# Patient Record
Sex: MALE | Race: WHITE | NOT HISPANIC OR LATINO | Employment: FULL TIME | URBAN - METROPOLITAN AREA
[De-identification: names, ages, dates, MRNs, and addresses within clinical notes are randomized per-mention and may not be internally consistent; named-entity substitution may affect disease eponyms.]

---

## 2017-01-30 ENCOUNTER — ALLSCRIPTS OFFICE VISIT (OUTPATIENT)
Dept: OTHER | Facility: OTHER | Age: 47
End: 2017-01-30

## 2017-01-30 DIAGNOSIS — I10 ESSENTIAL (PRIMARY) HYPERTENSION: ICD-10-CM

## 2017-01-30 DIAGNOSIS — E11.9 TYPE 2 DIABETES MELLITUS WITHOUT COMPLICATIONS (HCC): ICD-10-CM

## 2017-01-30 DIAGNOSIS — E78.1 PURE HYPERGLYCERIDEMIA: ICD-10-CM

## 2017-03-15 DIAGNOSIS — D72.829 ELEVATED WHITE BLOOD CELL COUNT: ICD-10-CM

## 2017-05-30 ENCOUNTER — GENERIC CONVERSION - ENCOUNTER (OUTPATIENT)
Dept: OTHER | Facility: OTHER | Age: 47
End: 2017-05-30

## 2017-05-30 ENCOUNTER — ALLSCRIPTS OFFICE VISIT (OUTPATIENT)
Dept: OTHER | Facility: OTHER | Age: 47
End: 2017-05-30

## 2017-05-30 DIAGNOSIS — E11.9 TYPE 2 DIABETES MELLITUS WITHOUT COMPLICATIONS (HCC): ICD-10-CM

## 2017-11-24 ENCOUNTER — APPOINTMENT (OUTPATIENT)
Dept: LAB | Facility: HOSPITAL | Age: 47
End: 2017-11-24
Attending: INTERNAL MEDICINE

## 2017-11-24 DIAGNOSIS — E11.9 TYPE 2 DIABETES MELLITUS WITHOUT COMPLICATIONS (HCC): ICD-10-CM

## 2017-11-24 LAB
CHOLEST SERPL-MCNC: 184 MG/DL (ref 50–200)
CREAT UR-MCNC: 150 MG/DL
EST. AVERAGE GLUCOSE BLD GHB EST-MCNC: 177 MG/DL
GLUCOSE P FAST SERPL-MCNC: 209 MG/DL (ref 65–99)
HBA1C MFR BLD: 7.8 % (ref 4.2–6.3)
HDLC SERPL-MCNC: 36 MG/DL (ref 40–60)
LDLC SERPL CALC-MCNC: 92 MG/DL (ref 0–100)
MICROALBUMIN UR-MCNC: 21.4 MG/L (ref 0–20)
MICROALBUMIN/CREAT 24H UR: 14 MG/G CREATININE (ref 0–30)
TRIGL SERPL-MCNC: 281 MG/DL

## 2017-11-24 PROCEDURE — 82570 ASSAY OF URINE CREATININE: CPT

## 2017-11-24 PROCEDURE — 82043 UR ALBUMIN QUANTITATIVE: CPT

## 2017-11-24 PROCEDURE — 82947 ASSAY GLUCOSE BLOOD QUANT: CPT

## 2017-11-24 PROCEDURE — 83036 HEMOGLOBIN GLYCOSYLATED A1C: CPT

## 2017-11-24 PROCEDURE — 36415 COLL VENOUS BLD VENIPUNCTURE: CPT

## 2017-11-24 PROCEDURE — 80061 LIPID PANEL: CPT

## 2017-11-27 ENCOUNTER — GENERIC CONVERSION - ENCOUNTER (OUTPATIENT)
Dept: OTHER | Facility: OTHER | Age: 47
End: 2017-11-27

## 2017-11-27 DIAGNOSIS — K21.9 GASTRO-ESOPHAGEAL REFLUX DISEASE WITHOUT ESOPHAGITIS: ICD-10-CM

## 2017-11-27 DIAGNOSIS — I10 ESSENTIAL (PRIMARY) HYPERTENSION: ICD-10-CM

## 2017-11-27 DIAGNOSIS — E11.9 TYPE 2 DIABETES MELLITUS WITHOUT COMPLICATIONS (HCC): ICD-10-CM

## 2017-11-27 DIAGNOSIS — E78.1 PURE HYPERGLYCERIDEMIA: ICD-10-CM

## 2017-12-02 ENCOUNTER — GENERIC CONVERSION - ENCOUNTER (OUTPATIENT)
Dept: INTERNAL MEDICINE CLINIC | Age: 47
End: 2017-12-02

## 2017-12-02 LAB
LEFT EYE DIABETIC RETINOPATHY: NORMAL
RIGHT EYE DIABETIC RETINOPATHY: NORMAL

## 2017-12-27 ENCOUNTER — ALLSCRIPTS OFFICE VISIT (OUTPATIENT)
Dept: OTHER | Facility: OTHER | Age: 47
End: 2017-12-27

## 2018-01-13 VITALS
SYSTOLIC BLOOD PRESSURE: 148 MMHG | DIASTOLIC BLOOD PRESSURE: 92 MMHG | BODY MASS INDEX: 34.7 KG/M2 | HEIGHT: 70 IN | HEART RATE: 94 BPM | OXYGEN SATURATION: 97 % | WEIGHT: 242.38 LBS | TEMPERATURE: 98.2 F

## 2018-01-22 VITALS
SYSTOLIC BLOOD PRESSURE: 136 MMHG | OXYGEN SATURATION: 99 % | WEIGHT: 238.38 LBS | HEIGHT: 70 IN | BODY MASS INDEX: 34.13 KG/M2 | DIASTOLIC BLOOD PRESSURE: 82 MMHG | HEART RATE: 98 BPM | TEMPERATURE: 98.2 F

## 2018-01-22 VITALS
HEART RATE: 99 BPM | BODY MASS INDEX: 33.64 KG/M2 | TEMPERATURE: 98.1 F | SYSTOLIC BLOOD PRESSURE: 136 MMHG | HEIGHT: 70 IN | DIASTOLIC BLOOD PRESSURE: 82 MMHG | OXYGEN SATURATION: 98 % | WEIGHT: 235 LBS

## 2018-01-23 NOTE — MISCELLANEOUS
Message  Return to work or school:   Nii Carrillo is under my professional care  He was seen in my office on 12/27/2017   He is able to return to work on  12/28/2017      Please excuse Mr Rosalia Jerez from work today, 12/27/2017  Thank you  Past Medical History  Active Problems And Past Medical History Reviewed: The active problems and past medical history were reviewed and updated today  Family History  Family History Reviewed: The family history was reviewed and updated today  Surgical History  Surgical History Reviewed: The surgical history was reviewed and updated today  Future Appointments    Signatures   Electronically signed by :  Precious Quan MD; Dec 27 2017  4:05PM EST                       (Author)

## 2018-01-24 VITALS
WEIGHT: 240.5 LBS | HEIGHT: 70 IN | DIASTOLIC BLOOD PRESSURE: 90 MMHG | BODY MASS INDEX: 34.43 KG/M2 | TEMPERATURE: 98.1 F | HEART RATE: 87 BPM | SYSTOLIC BLOOD PRESSURE: 134 MMHG | OXYGEN SATURATION: 98 %

## 2018-03-26 DIAGNOSIS — E11.9 TYPE 2 DIABETES MELLITUS WITHOUT COMPLICATIONS (HCC): ICD-10-CM

## 2018-03-28 RX ORDER — ATORVASTATIN CALCIUM 20 MG/1
TABLET, FILM COATED ORAL
Qty: 30 TABLET | Refills: 3 | OUTPATIENT
Start: 2018-03-28

## 2018-06-18 ENCOUNTER — TELEPHONE (OUTPATIENT)
Dept: INTERNAL MEDICINE CLINIC | Facility: CLINIC | Age: 48
End: 2018-06-18

## 2018-06-18 DIAGNOSIS — E11.9 DIABETES MELLITUS TYPE 2, NONINSULIN DEPENDENT (HCC): Primary | ICD-10-CM

## 2018-06-18 DIAGNOSIS — K21.9 GASTROESOPHAGEAL REFLUX DISEASE WITHOUT ESOPHAGITIS: ICD-10-CM

## 2018-06-18 RX ORDER — GLIMEPIRIDE 4 MG/1
1 TABLET ORAL 2 TIMES DAILY
COMMUNITY
Start: 2014-02-08 | End: 2018-06-18 | Stop reason: SDUPTHER

## 2018-06-18 RX ORDER — LANSOPRAZOLE 30 MG/1
1 CAPSULE, DELAYED RELEASE ORAL DAILY
COMMUNITY
Start: 2014-11-08 | End: 2018-06-18 | Stop reason: SDUPTHER

## 2018-06-18 NOTE — TELEPHONE ENCOUNTER
This patient had to have his appt rescheduled due to Dr Debra Harrington not being in the office 2 times  He does have an appt with Mario on 7/16  He does need his Metformin, Glimopride, and Omeprazol refilled  Can we please fill these to get him to his appt  His Pharmacy is [facility] in [place]

## 2018-06-19 RX ORDER — GLIMEPIRIDE 4 MG/1
4 TABLET ORAL 2 TIMES DAILY
Qty: 180 TABLET | Refills: 0 | Status: SHIPPED | OUTPATIENT
Start: 2018-06-19 | End: 2018-10-17 | Stop reason: SDUPTHER

## 2018-06-19 RX ORDER — LANSOPRAZOLE 30 MG/1
30 CAPSULE, DELAYED RELEASE ORAL DAILY
Qty: 90 CAPSULE | Refills: 0 | Status: SHIPPED | OUTPATIENT
Start: 2018-06-19 | End: 2018-10-17 | Stop reason: SDUPTHER

## 2018-06-23 LAB — HBA1C MFR BLD HPLC: 7.9 %

## 2018-07-16 ENCOUNTER — OFFICE VISIT (OUTPATIENT)
Dept: INTERNAL MEDICINE CLINIC | Facility: CLINIC | Age: 48
End: 2018-07-16
Payer: COMMERCIAL

## 2018-07-16 VITALS
DIASTOLIC BLOOD PRESSURE: 78 MMHG | TEMPERATURE: 98.5 F | BODY MASS INDEX: 34.21 KG/M2 | HEIGHT: 69 IN | HEART RATE: 100 BPM | WEIGHT: 231 LBS | RESPIRATION RATE: 20 BRPM | OXYGEN SATURATION: 97 % | SYSTOLIC BLOOD PRESSURE: 116 MMHG

## 2018-07-16 DIAGNOSIS — E11.8 TYPE 2 DIABETES MELLITUS WITH COMPLICATION, WITHOUT LONG-TERM CURRENT USE OF INSULIN (HCC): ICD-10-CM

## 2018-07-16 DIAGNOSIS — B35.1 ONYCHOMYCOSIS: ICD-10-CM

## 2018-07-16 DIAGNOSIS — I10 ESSENTIAL HYPERTENSION: Primary | ICD-10-CM

## 2018-07-16 DIAGNOSIS — B35.3 TINEA PEDIS OF BOTH FEET: ICD-10-CM

## 2018-07-16 PROBLEM — E78.1 HYPERTRIGLYCERIDEMIA: Status: ACTIVE | Noted: 2017-01-30

## 2018-07-16 PROCEDURE — 99214 OFFICE O/P EST MOD 30 MIN: CPT | Performed by: NURSE PRACTITIONER

## 2018-07-16 PROCEDURE — 3074F SYST BP LT 130 MM HG: CPT | Performed by: NURSE PRACTITIONER

## 2018-07-16 PROCEDURE — 3078F DIAST BP <80 MM HG: CPT | Performed by: NURSE PRACTITIONER

## 2018-07-16 RX ORDER — ELECTROLYTES/DEXTROSE
1 SOLUTION, ORAL ORAL DAILY
COMMUNITY

## 2018-07-16 RX ORDER — LOSARTAN POTASSIUM 100 MG/1
1 TABLET ORAL DAILY
COMMUNITY
Start: 2014-07-08 | End: 2018-07-16 | Stop reason: SDUPTHER

## 2018-07-16 RX ORDER — KETOCONAZOLE 20 MG/G
1 CREAM TOPICAL DAILY
Qty: 15 G | Refills: 3 | Status: SHIPPED | OUTPATIENT
Start: 2018-07-16 | End: 2020-01-07

## 2018-07-16 RX ORDER — FENOFIBRATE 145 MG/1
1 TABLET, COATED ORAL DAILY
COMMUNITY
Start: 2017-01-30 | End: 2018-10-17 | Stop reason: SDUPTHER

## 2018-07-16 RX ORDER — LOSARTAN POTASSIUM 100 MG/1
100 TABLET ORAL DAILY
Qty: 90 TABLET | Refills: 1 | Status: SHIPPED | OUTPATIENT
Start: 2018-07-16 | End: 2018-10-17 | Stop reason: SDUPTHER

## 2018-07-16 RX ORDER — KETOCONAZOLE 20 MG/G
1 CREAM TOPICAL DAILY
COMMUNITY
Start: 2016-07-19 | End: 2018-07-16 | Stop reason: SDUPTHER

## 2018-07-16 NOTE — PROGRESS NOTES
Assessment/Plan:    No problem-specific Assessment & Plan notes found for this encounter  Diagnoses and all orders for this visit:    Essential hypertension  -     losartan (COZAAR) 100 MG tablet; Take 1 tablet (100 mg total) by mouth daily    Type 2 diabetes mellitus with complication, without long-term current use of insulin ([facility])    Tinea pedis of both feet  -     ketoconazole (NIZORAL) 2 % cream; Apply 1 application topically daily  -     Ambulatory referral to Podiatry; Future    Onychomycosis  -     Ambulatory referral to Podiatry; Future    Other orders  -     fenofibrate (TRICOR) 145 mg tablet; Take 1 tablet by mouth daily  -     Discontinue: ketoconazole (NIZORAL) 2 % cream; Apply 1 application topically daily  -     Discontinue: losartan (COZAAR) 100 MG tablet; Take 1 tablet by mouth daily  -     Multiple Vitamins-Minerals (MULTIVITAMIN ADULT) TABS; Take 1 tablet by mouth daily  -     Omega 3-6-9 Fatty Acids (OMEGA 3-6-9 COMPLEX PO); Take 1 capsule by mouth daily      Discussed with patient have 0 hemoglobin A1c has worsened from 7 8-7 9, indicating that his point of care blood sugars are not indicative his A1c  After further investigation, it was determined that the patient eats small snacks intermittently throughout the day and a large meal at night  This is why he only take glimepiride once in the morning and metformin twice daily  The importance of a well-balanced meal 3 times a day which includes carbohydrates, protein, and vegetables was stressed to the patient and education was given to him regarding how blood sugars react to different types of foods  The patient verbalized understanding and indicates that he will start to eat a well-balanced meal 3 times daily, as well as start taking his glimepiride twice daily as prescribed, as well as continue with his metformin twice daily  Will continue with his current dosing of losartan    With regards to his onychomycosis, will refer to Podiatry for further management, given that the patient is a diabetic  Patient verbalizes understanding  Will follow up with patient in 6 weeks or sooner as needed  Subjective:      Patient ID: Caitlin Boateng is a 51 y o  male  Patient presents for a 4 month follow up on HTN and diabetes mellitus  He reports that he feels well overall  He has only been taking glimeperide once daily because he has been checking his blood sugars and they are in the 110's and he thought that was okay for him  His HgbA1c was 7 8 in November 2017, and is now 7 9  Diabetes   He presents for his follow-up diabetic visit  He has type 2 diabetes mellitus  His disease course has been worsening  Hypoglycemia symptoms include nervousness/anxiousness (at baseline per patient)  Pertinent negatives for hypoglycemia include no confusion or headaches  Associated symptoms include fatigue and polydipsia  Pertinent negatives for diabetes include no blurred vision, no chest pain, no foot paresthesias, no foot ulcerations, no polyphagia, no polyuria, no visual change, no weakness and no weight loss  Symptoms are stable  Risk factors for coronary artery disease include diabetes mellitus, dyslipidemia, hypertension, male sex and obesity  Current diabetic treatment includes oral agent (dual therapy)  He is compliant with treatment some of the time  He is following a generally unhealthy diet  When asked about meal planning, he reported none  His home blood glucose trend is fluctuating dramatically  An ACE inhibitor/angiotensin II receptor blocker is being taken  He does not see a podiatrist Eye exam is current  Hypertension   This is a chronic problem  The current episode started more than 1 year ago  The problem is unchanged  The problem is controlled  Associated symptoms include malaise/fatigue  Pertinent negatives include no blurred vision, chest pain, headaches, peripheral edema or shortness of breath   There are no associated agents to hypertension  Risk factors for coronary artery disease include diabetes mellitus, dyslipidemia, male gender and smoking/tobacco exposure  Past treatments include angiotensin blockers  The current treatment provides moderate improvement  The following portions of the patient's history were reviewed and updated as appropriate: allergies, current medications, past family history, past medical history, past social history, past surgical history and problem list     Review of Systems   Constitutional: Positive for fatigue and malaise/fatigue  Negative for chills, fever and weight loss  HENT: Negative for congestion and trouble swallowing  Eyes: Negative for blurred vision and pain  Respiratory: Negative for choking, shortness of breath and wheezing  Cardiovascular: Negative for chest pain and leg swelling  Gastrointestinal: Positive for diarrhea (occasionally)  Negative for abdominal pain, constipation, nausea and vomiting  Endocrine: Positive for polydipsia  Negative for polyphagia and polyuria  Genitourinary: Negative for dysuria  Musculoskeletal: Negative for gait problem  Skin: Negative for rash  Neurological: Negative for weakness and headaches  Psychiatric/Behavioral: Negative for confusion  The patient is nervous/anxious (at baseline per patient)            Past Medical History:   Diagnosis Date    Cellulitis of elbow     last assessed - [id number]    Diverticulitis     Fatigue     last assessed - [id number]    History of gastroesophageal reflux (GERD)     Resolved - [id number]    Iron overload     last assessed - [id number]    Low HDL (under 40)     last assessed - [id number]    Tinea pedis of both feet     last assessed - [id number]         Current Outpatient Prescriptions:     fenofibrate (TRICOR) 145 mg tablet, Take 1 tablet by mouth daily, Disp: , Rfl:     glimepiride (AMARYL) 4 mg tablet, Take 1 tablet (4 mg total) by mouth 2 (two) times a day (Patient taking differently: Take 4 mg by mouth daily with breakfast  ), Disp: 180 tablet, Rfl: 0    ketoconazole (NIZORAL) 2 % cream, Apply 1 application topically daily, Disp: 15 g, Rfl: 3    lansoprazole (PREVACID) 30 mg capsule, Take 1 capsule (30 mg total) by mouth daily, Disp: 90 capsule, Rfl: 0    losartan (COZAAR) 100 MG tablet, Take 1 tablet (100 mg total) by mouth daily, Disp: 90 tablet, Rfl: 1    metFORMIN (GLUCOPHAGE) 1000 MG tablet, Take 1 tablet (1,000 mg total) by mouth 2 (two) times a day with meals, Disp: 180 tablet, Rfl: 0    Multiple Vitamins-Minerals (MULTIVITAMIN ADULT) TABS, Take 1 tablet by mouth daily, Disp: , Rfl:     Omega 3-6-9 Fatty Acids (OMEGA 3-6-9 COMPLEX PO), Take 1 capsule by mouth daily, Disp: , Rfl:     No Known Allergies    Social History   Past Surgical History:   Procedure Laterality Date    TONSILLECTOMY       Family History   Problem Relation Age of Onset    Leukemia Father     Diabetes Family     Hypertension Family        Objective:  /78 (BP Location: Left arm, Patient Position: Sitting, Cuff Size: Large)   Pulse 100   Temp 98 5 °F (36 9 °C) (Oral)   Resp 20   Ht 5' 9" (1 753 m) Comment: with shoes on  Wt 105 kg (231 lb) Comment: with shoes on  SpO2 97% Comment: room air  BMI 34 11 kg/m²        Physical Exam   Constitutional: He is oriented to person, place, and time  He appears well-developed and well-nourished  No distress  HENT:   Head: Normocephalic and atraumatic  Right Ear: External ear normal    Left Ear: External ear normal    Mouth/Throat: Oropharynx is clear and moist    Eyes: Conjunctivae are normal  Pupils are equal, round, and reactive to light  No scleral icterus  Neck: Normal range of motion  Neck supple  No thyromegaly present  Cardiovascular: Normal rate, regular rhythm and normal heart sounds  Pulmonary/Chest: Effort normal and breath sounds normal  No respiratory distress  Abdominal: Soft  Bowel sounds are normal  He exhibits no distension  Musculoskeletal: Normal range of motion  He exhibits no edema  Lymphadenopathy:     He has no cervical adenopathy  Neurological: He is alert and oriented to person, place, and time  Skin: Skin is warm and dry  Psychiatric: He has a normal mood and affect  His behavior is normal  Judgment and thought content normal    Vitals reviewed

## 2018-07-16 NOTE — PROGRESS NOTES
Diabetic Foot Exam    Patient's shoes and socks removed  Right Foot/Ankle   Right Foot Inspection  Skin Exam: dry skin                          Toe Exam: ROM and strength within normal limits  Sensory       Monofilament testing: intact  Vascular  Capillary refills: < 3 seconds  The right DP pulse is 2+  The right PT pulse is 2+  Left Foot/Ankle  Left Foot Inspection  Skin Exam: dry skin                         Toe Exam: ROM and strength within normal limits                   Sensory       Monofilament: intact  Vascular  Capillary refills: < 3 seconds  The left DP pulse is 2+  The left PT pulse is 2+  Assign Risk Category:  No deformity present;  No loss of protective sensation;        Risk: 0

## 2018-07-16 NOTE — PATIENT INSTRUCTIONS
Start taking your glimeperide twice daily as prescribed  Continue with metformin as prescribed  Referred to podiatry for evaluation of toenails  Meal Planning with the Plate Method   AMBULATORY CARE:   Meal planning with the plate method  is a simple way for people with diabetes to plan meals  This method can help you eat the right amount of carbohydrates and keep your blood sugar levels under control  Carbohydrates naturally raise your blood sugar level  Your blood sugar level can rise too high if you eat too much carbohydrate at one time  Carbohydrates are found in starches (bread, cereal, starchy vegetables, and beans), fruit, milk, yogurt, and sweets  How to use the plate method to plan meals:   · Draw an imaginary line down the middle of a 9-inch dinner plate  On one side, draw another line to divide that section in half  Your plate will have 3 sections  · Fill the largest section of your plate with non-starchy vegetables  These include broccoli, spinach, cucumbers, peppers, cauliflower, and tomatoes  · Add a starch to 1 of the small sections of your plate  Starches include pasta, rice, whole-grain bread, tortillas, corn, potatoes, and beans  · Add meat or another source of protein to the other small section of your plate  Examples include chicken or turkey without skin, fish, lean beef or pork, low-fat cheese, tofu, or eggs  · Add dairy or fruit to the side of your plate if your meal plan allows  Examples of dairy include skim or 1% milk or low-fat yogurt  If you do not drink milk, you may be able to add another serving of starchy food instead  · Add a low-calorie or calorie-free drink such as water or unsweetened tea or coffee         Serving sizes of foods:   · Non-starchy vegetables:      ¨ ½ cup of cooked vegetables or 1 cup of raw vegetables    ¨ ½ cup of vegetable juice    · Starches:      ¨ 1 ounce of whole-wheat bread or 1 small (6 inch) flour or corn tortilla    ¨ 1 small (4 inch) pancake (about ¼ inch thick)    ¨ ¾ cup of dry, unsweetened, whole-grain ready-to-eat cereal or ¼ cup of low-fat granola    ¨ ½ cup of cooked cereal or oatmeal    ¨ ? cup of rice or pasta     ¨ ½ cup of corn, green peas, sweet potatoes, or mashed potatoes    ¨ ½ cup of cooked beans and peas (garbanzo, lucas, kidney, white, split, black-eyed)    · Meat and other protein sources:      ¨ 3 to 4 ounces of any lean meat, fish, or poultry    ¨ ½ cup of tofu or tempeh    ¨ 1 large egg    ¨ 1½ ounces (about 2 tablespoons) of nuts or 2 tablespoons of peanut butter    · Fruit:      ¨ 1 small piece of fresh fruit     ¨ ½ cup of canned or fresh fruit or unsweetened fruit juice    ¨ ¼ cup of dried fruit    · Milk and yogurt:      ¨ 1 cup (8 ounces) of skim or 1% milk    ¨ ¾ cup (6 ounces) of plain, non-fat yogurt  Other guidelines to follow:   · Limit salt and sugar  Choose and prepare foods and drinks with less salt and added sugars  Use the nutrition information on food labels to help you make healthy choices  The percent daily value listed on the food label tells you whether a food is low or high in certain nutrients  A percent daily value of 5% or less means that the food is low in a nutrient  A percent daily value of 20% or more means that the food is high in a nutrient  · Choose healthy fats  Choose healthy fats such as polyunsaturated and monounsaturated fats in place of unhealthy fats  Healthy fats are found in vegetable oils such as soybean, corn, canola, olive, and sunflower oil  Unhealthy fats are saturated fats, trans fats, and cholesterol  Unhealthy fats are found in shortening, butter, stick margarine, and animal fat  · Ask your healthcare provider if alcohol is safe for you  and how much is safe for you  If you choose to drink alcohol, drink it with meals  When you drink alcohol on an empty stomach, your blood sugar may fall to a low level    © 2017 [employer] [employer] Information is for End User's use only and may not be sold, redistributed or otherwise used for commercial purposes  All illustrations and images included in CareNotes® are the copyrighted property of [employer]  or [employer]  The above information is an  only  It is not intended as medical advice for individual conditions or treatments  Talk to your doctor, nurse or pharmacist before following any medical regimen to see if it is safe and effective for you

## 2018-09-14 DIAGNOSIS — K21.9 GASTROESOPHAGEAL REFLUX DISEASE WITHOUT ESOPHAGITIS: ICD-10-CM

## 2018-09-14 DIAGNOSIS — E11.9 DIABETES MELLITUS TYPE 2, NONINSULIN DEPENDENT (HCC): ICD-10-CM

## 2018-09-16 RX ORDER — LANSOPRAZOLE 30 MG/1
CAPSULE, DELAYED RELEASE ORAL
Qty: 90 CAPSULE | Refills: 0 | OUTPATIENT
Start: 2018-09-16

## 2018-09-22 LAB — HBA1C MFR BLD HPLC: 8.3 %

## 2018-09-28 DIAGNOSIS — E11.9 DIABETES MELLITUS TYPE 2, NONINSULIN DEPENDENT (HCC): ICD-10-CM

## 2018-09-30 RX ORDER — GLIMEPIRIDE 4 MG/1
4 TABLET ORAL 2 TIMES DAILY
Qty: 180 TABLET | Refills: 0 | OUTPATIENT
Start: 2018-09-30

## 2018-10-17 ENCOUNTER — OFFICE VISIT (OUTPATIENT)
Dept: INTERNAL MEDICINE CLINIC | Facility: CLINIC | Age: 48
End: 2018-10-17
Payer: COMMERCIAL

## 2018-10-17 VITALS
TEMPERATURE: 98.4 F | DIASTOLIC BLOOD PRESSURE: 100 MMHG | OXYGEN SATURATION: 98 % | HEIGHT: 69 IN | BODY MASS INDEX: 34.16 KG/M2 | SYSTOLIC BLOOD PRESSURE: 180 MMHG | WEIGHT: 230.6 LBS | HEART RATE: 78 BPM

## 2018-10-17 DIAGNOSIS — E78.1 HYPERTRIGLYCERIDEMIA: Primary | ICD-10-CM

## 2018-10-17 DIAGNOSIS — I10 BENIGN ESSENTIAL HTN: ICD-10-CM

## 2018-10-17 DIAGNOSIS — E11.9 DIABETES MELLITUS TYPE 2, NONINSULIN DEPENDENT (HCC): ICD-10-CM

## 2018-10-17 DIAGNOSIS — E11.8 TYPE 2 DIABETES MELLITUS WITH COMPLICATION, WITHOUT LONG-TERM CURRENT USE OF INSULIN (HCC): ICD-10-CM

## 2018-10-17 DIAGNOSIS — K21.9 GASTROESOPHAGEAL REFLUX DISEASE WITHOUT ESOPHAGITIS: ICD-10-CM

## 2018-10-17 DIAGNOSIS — I10 ESSENTIAL HYPERTENSION: ICD-10-CM

## 2018-10-17 PROCEDURE — 4010F ACE/ARB THERAPY RXD/TAKEN: CPT | Performed by: NURSE PRACTITIONER

## 2018-10-17 PROCEDURE — 3008F BODY MASS INDEX DOCD: CPT | Performed by: NURSE PRACTITIONER

## 2018-10-17 PROCEDURE — 99214 OFFICE O/P EST MOD 30 MIN: CPT | Performed by: NURSE PRACTITIONER

## 2018-10-17 RX ORDER — FENOFIBRATE 145 MG/1
145 TABLET, COATED ORAL DAILY
Qty: 90 TABLET | Refills: 0 | Status: SHIPPED | OUTPATIENT
Start: 2018-10-17 | End: 2019-08-22 | Stop reason: ALTCHOICE

## 2018-10-17 RX ORDER — LOSARTAN POTASSIUM 100 MG/1
100 TABLET ORAL DAILY
Qty: 90 TABLET | Refills: 0 | Status: SHIPPED | OUTPATIENT
Start: 2018-10-17 | End: 2021-11-10 | Stop reason: SDUPTHER

## 2018-10-17 RX ORDER — ATORVASTATIN CALCIUM 20 MG/1
20 TABLET, FILM COATED ORAL DAILY
Qty: 90 TABLET | Refills: 0 | Status: SHIPPED | OUTPATIENT
Start: 2018-10-17 | End: 2019-08-22 | Stop reason: ALTCHOICE

## 2018-10-17 RX ORDER — GLIMEPIRIDE 4 MG/1
4 TABLET ORAL 2 TIMES DAILY
Qty: 180 TABLET | Refills: 0 | Status: SHIPPED | OUTPATIENT
Start: 2018-10-17 | End: 2020-01-07 | Stop reason: SDUPTHER

## 2018-10-17 RX ORDER — LANSOPRAZOLE 30 MG/1
30 CAPSULE, DELAYED RELEASE ORAL DAILY
Qty: 90 CAPSULE | Refills: 0 | Status: SHIPPED | OUTPATIENT
Start: 2018-10-17 | End: 2019-03-27 | Stop reason: SDUPTHER

## 2018-10-17 NOTE — ASSESSMENT & PLAN NOTE
Patient is cared currently well controlled on Prevacid  You may use OTC tums as needed  Recommend small frequent meals throughout the day  Avoid aggravating foods - spicy foods, acidic foods - such as tomato and citrus  Avoid alcohol  Do not lay down for at least 30 mins after eating

## 2018-10-17 NOTE — PROGRESS NOTES
Assessment/Plan:    GERD (gastroesophageal reflux disease)   Patient is cared currently well controlled on Prevacid  You may use OTC tums as needed  Recommend small frequent meals throughout the day  Avoid aggravating foods - spicy foods, acidic foods - such as tomato and citrus  Avoid alcohol  Do not lay down for at least 30 mins after eating  Type 2 diabetes mellitus ([facility] [address] )  Lab Results   Component Value Date    HGBA1C 8 3 09/22/2018     Patient's diabetes uncontrolled  Patient currently eats a high sugar and healthy diet  Patient does admit he  Recently quit drinking alcohol  Patient states that he takes his metformin twice a day however he was only taking his Amaryl once a day, and states he sometimes forgets his metformin  I discussed the risk of uncontrolled diabetes with this patient as he is not compliant with his medications  I advised patient to take his metformin and Amaryl as prescribed, and will get a follow-up hemoglobin A1c in 3 months  I also discussed with patient that his blood sugar readings which are sporadic are only indicated of his blood sugar at that time not an average of his blood sugars  Patient is to continue to work on diet and exercise  Limit sugars and carbohydrate intake  Avoid soda, juice, sweets, cookies, desserts, pasta, bread    Eat more whole grains, exercised 30 min of cardio at least 3 times a week  Also recommended daily foot exams to check for sores, and recommended yearly eye exams  Benign essential HTN    Patient's blood pressure elevated at a physical today, patient states he was rushing around, and drank a very large iced coffee prior to his visit today  I discussed signs and symptoms of stroke with patient patient does admit he has not been taking his blood pressure medications as prescribed  Discussed the risks of having an elevated blood pressure    Advised patient to continue to take losartan 100 mg tablet daily, and will get a recheck blood pressure in 1 week  Continue to monitor blood pressure at home  Goal BP is < 130/80  Contact our office for consistent elevations  Recommend low sodium diet  Exercise 30 minutes three times a week as tolerated  Recommend yearly eye exam       Hypertriglyceridemia    Patient did not get a lipid panel done prior to his appointment today  Patient does admit that he has been taking his fenofibrate atorvastatin and fish oil sporadically  Did advise patient to take his medications as prescribed and having an elevated cholesterol panel could put him at risk for stroke  Recommend healthy lifestyle choices for your cholesterol  Low fat/low cholesterol diet  Limit/avoid red meat  Eat more lean meat - chicken breast, ground turkey, fish  Exercise 30 mins at least 3 times a week as tolerated  Diagnoses and all orders for this visit:    Hypertriglyceridemia  -     fenofibrate (TRICOR) 145 mg tablet; Take 1 tablet (145 mg total) by mouth daily  -     atorvastatin (LIPITOR) 20 mg tablet; Take 1 tablet (20 mg total) by mouth daily  -     Lipid panel    Diabetes mellitus type 2, noninsulin dependent (HCC)  -     glimepiride (AMARYL) 4 mg tablet; Take 1 tablet (4 mg total) by mouth 2 (two) times a day  -     metFORMIN (GLUCOPHAGE) 1000 MG tablet; Take 1 tablet (1,000 mg total) by mouth 2 (two) times a day with meals    Gastroesophageal reflux disease without esophagitis  -     lansoprazole (PREVACID) 30 mg capsule; Take 1 capsule (30 mg total) by mouth daily    Essential hypertension  -     losartan (COZAAR) 100 MG tablet; Take 1 tablet (100 mg total) by mouth daily  -     Comprehensive metabolic panel; Future  -     CBC and differential    Benign essential HTN    Type 2 diabetes mellitus with complication, without long-term current use of insulin (HCC)  -     Hemoglobin A1C          Subjective:      Patient ID: Rajan Obregon is a 51 y o  male  Patient presents today to follow-up on DM and HTN  Patient has been very noncompliant with his medications, he has been taking his the metformin twice a day, and his Amaryl once a day  Patient states he has a very unhealthy high-calorie diet  Patient states he recently quit drinking alcohol  Patient currently states that we are unable to check his feet today, he refuses for diabetic foot exam   Patient also states that he has been taking his blood pressure medication on and off  Patient also states he has been taking his cholesterol medication on and off  Screenings:    Eye Doctor-wear glasses, he is due  Dentist- every 6 months          Diabetes   He presents for his follow-up diabetic visit  He has type 2 diabetes mellitus  His disease course has been worsening  Pertinent negatives for hypoglycemia include no dizziness or headaches  Pertinent negatives for diabetes include no blurred vision, no chest pain, no polydipsia, no polyphagia, no polyuria and no weakness  There are no hypoglycemic complications  Risk factors for coronary artery disease include diabetes mellitus, dyslipidemia, obesity, male sex and tobacco exposure  Current diabetic treatments:   Patient currently taking metformin in Amaryl however he is not compliant  He is compliant with treatment some of the time  Prior visit with dietitian:  patient refuses dietitian visit  He rarely participates in exercise  His bedtime blood glucose is taken between 10-11 pm  His bedtime blood glucose range is generally 110-130 mg/dl  Hypertension   This is a chronic problem  The current episode started more than 1 year ago  The problem has been waxing and waning since onset  The problem is uncontrolled  Pertinent negatives include no anxiety, blurred vision, chest pain, headaches, neck pain, palpitations, peripheral edema, PND or shortness of breath  Risk factors for coronary artery disease include male gender, obesity, sedentary lifestyle, smoking/tobacco exposure and dyslipidemia   Treatments tried: patient currently taking losartan  Compliance problems include exercise and diet ( patient takes his medication on and off, not compliant)  Hyperlipidemia   This is a chronic problem  The current episode started more than 1 year ago  Condition status:  patient did not have lipid panel done prior to office visit today  Exacerbating diseases include diabetes and obesity  Pertinent negatives include no chest pain or shortness of breath  Treatments tried:  patient currently taking atorvastatin, fenofibrate as well as fish oil however patient has not been taking these consistently  Compliance problems include adherence to diet  The following portions of the patient's history were reviewed and updated as appropriate: allergies, current medications, past family history, past medical history, past social history, past surgical history and problem list     Review of Systems   Constitutional: Negative for activity change, appetite change, chills, diaphoresis and fever  HENT: Negative for congestion, ear discharge, ear pain, postnasal drip, rhinorrhea, sinus pain, sinus pressure and sore throat  Eyes: Negative for blurred vision, pain, discharge, itching and visual disturbance  Respiratory: Negative for cough, chest tightness, shortness of breath and wheezing  Cardiovascular: Negative for chest pain, palpitations, leg swelling and PND  Gastrointestinal: Negative for abdominal pain, blood in stool, constipation, diarrhea, nausea and vomiting  Endocrine: Negative for polydipsia, polyphagia and polyuria  Genitourinary: Negative for difficulty urinating, dysuria, hematuria and urgency  Musculoskeletal: Negative for arthralgias, back pain and neck pain  Skin: Negative for rash and wound  Neurological: Negative for dizziness, weakness, numbness and headaches           Past Medical History:   Diagnosis Date    Cellulitis of elbow     last assessed - [id number]    Diabetes mellitus ([facility] [address] )     Diverticulitis     Fatigue     last assessed - [id number]    GERD (gastroesophageal reflux disease)     History of gastroesophageal reflux (GERD)     Resolved - [id number]    Hypertension     Iron overload     last assessed - [id number]    Low HDL (under 40)     last assessed - [id number]    Tinea pedis of both feet     last assessed - [id number]         Current Outpatient Prescriptions:     fenofibrate (TRICOR) 145 mg tablet, Take 1 tablet (145 mg total) by mouth daily, Disp: 90 tablet, Rfl: 0    glimepiride (AMARYL) 4 mg tablet, Take 1 tablet (4 mg total) by mouth 2 (two) times a day, Disp: 180 tablet, Rfl: 0    ketoconazole (NIZORAL) 2 % cream, Apply 1 application topically daily, Disp: 15 g, Rfl: 3    lansoprazole (PREVACID) 30 mg capsule, Take 1 capsule (30 mg total) by mouth daily, Disp: 90 capsule, Rfl: 0    losartan (COZAAR) 100 MG tablet, Take 1 tablet (100 mg total) by mouth daily, Disp: 90 tablet, Rfl: 0    metFORMIN (GLUCOPHAGE) 1000 MG tablet, Take 1 tablet (1,000 mg total) by mouth 2 (two) times a day with meals, Disp: 180 tablet, Rfl: 0    Multiple Vitamins-Minerals (MULTIVITAMIN ADULT) TABS, Take 1 tablet by mouth daily, Disp: , Rfl:     Omega 3-6-9 Fatty Acids (OMEGA 3-6-9 COMPLEX PO), Take 1 capsule by mouth daily, Disp: , Rfl:     atorvastatin (LIPITOR) 20 mg tablet, Take 1 tablet (20 mg total) by mouth daily, Disp: 90 tablet, Rfl: 0    No Known Allergies    Social History   Past Surgical History:   Procedure Laterality Date    TONSILLECTOMY       Family History   Problem Relation Age of Onset    Leukemia Father     Diabetes Family     Hypertension Family        Objective:  BP (!) 180/100 (BP Location: Left arm, Patient Position: Sitting, Cuff Size: Large)   Pulse 78   Temp 98 4 °F (36 9 °C) (Oral)   Ht 5' 9" (1 753 m)   Wt 105 kg (230 lb 9 6 oz)   SpO2 98%   BMI 34 05 kg/m²     Recent Results (from the past 1344 hour(s))   Hemoglobin A1C    Collection Time: 09/22/18 12:00 AM Result Value Ref Range    Hemoglobin A1C 8 3             Physical Exam   Constitutional: He is oriented to person, place, and time  He appears well-developed and well-nourished  No distress  HENT:   Head: Normocephalic and atraumatic  Right Ear: External ear normal    Left Ear: External ear normal    Nose: Nose normal    Mouth/Throat: Oropharynx is clear and moist  No oropharyngeal exudate  Eyes: Pupils are equal, round, and reactive to light  Conjunctivae and EOM are normal  Right eye exhibits no discharge  Left eye exhibits no discharge  Neck: Normal range of motion  Neck supple  No thyromegaly present  Cardiovascular: Normal rate, regular rhythm, normal heart sounds and intact distal pulses  Exam reveals no gallop and no friction rub  No murmur heard  Pulmonary/Chest: Effort normal and breath sounds normal  No stridor  No respiratory distress  He has no wheezes  He has no rales  Abdominal: Soft  Bowel sounds are normal  He exhibits no distension  There is no tenderness  Lymphadenopathy:     He has no cervical adenopathy  Neurological: He is alert and oriented to person, place, and time  Skin: Skin is warm and dry  No rash noted  He is not diaphoretic  No erythema  Psychiatric: He has a normal mood and affect   His behavior is normal  Judgment and thought content normal

## 2018-10-17 NOTE — ASSESSMENT & PLAN NOTE
Lab Results   Component Value Date    HGBA1C 8 3 09/22/2018     Patient's diabetes uncontrolled  Patient currently eats a high sugar and healthy diet  Patient does admit he  Recently quit drinking alcohol  Patient states that he takes his metformin twice a day however he was only taking his Amaryl once a day, and states he sometimes forgets his metformin  I discussed the risk of uncontrolled diabetes with this patient as he is not compliant with his medications  I advised patient to take his metformin and Amaryl as prescribed, and will get a follow-up hemoglobin A1c in 3 months  I also discussed with patient that his blood sugar readings which are sporadic are only indicated of his blood sugar at that time not an average of his blood sugars  Patient is to continue to work on diet and exercise  Limit sugars and carbohydrate intake  Avoid soda, juice, sweets, cookies, desserts, pasta, bread    Eat more whole grains, exercised 30 min of cardio at least 3 times a week  Also recommended daily foot exams to check for sores, and recommended yearly eye exams

## 2018-10-17 NOTE — ASSESSMENT & PLAN NOTE
Patient's blood pressure elevated at a physical today, patient states he was rushing around, and drank a very large iced coffee prior to his visit today  I discussed signs and symptoms of stroke with patient patient does admit he has not been taking his blood pressure medications as prescribed  Discussed the risks of having an elevated blood pressure  Advised patient to continue to take losartan 100 mg tablet daily, and will get a recheck blood pressure in 1 week  Continue to monitor blood pressure at home  Goal BP is < 130/80  Contact our office for consistent elevations  Recommend low sodium diet  Exercise 30 minutes three times a week as tolerated    Recommend yearly eye exam

## 2018-10-27 ENCOUNTER — TELEPHONE (OUTPATIENT)
Dept: INTERNAL MEDICINE CLINIC | Facility: CLINIC | Age: 48
End: 2018-10-27

## 2018-10-27 DIAGNOSIS — I10 BENIGN ESSENTIAL HTN: Primary | ICD-10-CM

## 2018-10-27 RX ORDER — AMLODIPINE BESYLATE 10 MG/1
10 TABLET ORAL DAILY
Qty: 30 TABLET | Refills: 0 | Status: SHIPPED | OUTPATIENT
Start: 2018-10-27 | End: 2019-09-19 | Stop reason: SDUPTHER

## 2018-10-27 NOTE — TELEPHONE ENCOUNTER
10/27/18- Pt came in today for a BP check for 10:30 am today  When I took his blood pressure I explained to him if its high he needs to see the doctor  He kept telling me its just for a check not an actual visit  He stated that he had people out in the car waiting for him  His blood pressure when I took it was 168/74  Pt left against medical advice   Ariana Torres MA

## 2018-10-27 NOTE — TELEPHONE ENCOUNTER
Called to discuss with patient, will add on Norvasc 10 mg  His wife thinks he may be Bipolar, his anxiety increases his BP,  His wife says he is anxious at all times  I did discuss with her that he should be worked up for anxiety  However his blood pressure is still elevated to high  Discussed with patient and his wife  Did advise him to set up an appointment to be seen for anxiety and blood pressure recheck

## 2019-03-27 ENCOUNTER — OFFICE VISIT (OUTPATIENT)
Dept: INTERNAL MEDICINE CLINIC | Facility: CLINIC | Age: 49
End: 2019-03-27
Payer: COMMERCIAL

## 2019-03-27 VITALS
BODY MASS INDEX: 34.17 KG/M2 | WEIGHT: 231.4 LBS | HEART RATE: 92 BPM | SYSTOLIC BLOOD PRESSURE: 140 MMHG | OXYGEN SATURATION: 97 % | DIASTOLIC BLOOD PRESSURE: 90 MMHG | TEMPERATURE: 98.2 F

## 2019-03-27 DIAGNOSIS — K21.9 GASTROESOPHAGEAL REFLUX DISEASE WITHOUT ESOPHAGITIS: ICD-10-CM

## 2019-03-27 DIAGNOSIS — R68.89 FLU-LIKE SYMPTOMS: Primary | ICD-10-CM

## 2019-03-27 DIAGNOSIS — E11.9 DIABETES MELLITUS TYPE 2, NONINSULIN DEPENDENT (HCC): ICD-10-CM

## 2019-03-27 PROCEDURE — 99213 OFFICE O/P EST LOW 20 MIN: CPT | Performed by: NURSE PRACTITIONER

## 2019-03-27 RX ORDER — LANSOPRAZOLE 30 MG/1
30 CAPSULE, DELAYED RELEASE ORAL DAILY
Qty: 90 CAPSULE | Refills: 0 | Status: SHIPPED | OUTPATIENT
Start: 2019-03-27 | End: 2019-08-22 | Stop reason: SDUPTHER

## 2019-03-27 NOTE — PROGRESS NOTES
Assessment/Plan:    Flu-like symptoms  Patient had positive contact with the flu  Will advise patient to get over the counter Mucinex  Rest and fluids advised  Educated that the course of this illness could be 2-4 weeks  Discussed symptomatic relief, such as warm steam inhalations, tylenol/ibuprofen for fevers and body aches, rest, and drink plenty of fluids  Warm salt gargles for sore throat  Discussed red flag signs to go to the ER, such as chest pain or shortness of breath  Return to the office for reevaluation if symptoms worsen or do not improve in 1-2 weeks         Diagnoses and all orders for this visit:    Flu-like symptoms    Gastroesophageal reflux disease without esophagitis  -     lansoprazole (PREVACID) 30 mg capsule; Take 1 capsule (30 mg total) by mouth daily    Diabetes mellitus type 2, noninsulin dependent (HCC)  -     metFORMIN (GLUCOPHAGE) 1000 MG tablet; Take 1 tablet (1,000 mg total) by mouth 2 (two) times a day with meals          Subjective:      Patient ID: Walt Montejo is a 51 y o  male  Aman Mullins is a 51year old  male with a significant past medical history for hypertension, type 2 diabetes mellitus, and current smoker who presents to the office complaining of not feeling well for 4 days  He reports sudden fatigue, body aches, chills, and congestion  He denies shortness of breath or chest tightness  He reports his girlfriend was diagnosed with the flu 6 days ago  He has been taking Dayquil and Nyquil with mild relief  Last dose was this morning  He has been focusing on hydration with lots of water, juice, and soup  He states his urine is medium yellow  He states his symptoms have mildly improved  He did not receive the flu shot           The following portions of the patient's history were reviewed and updated as appropriate: allergies, current medications, past family history, past medical history, past social history, past surgical history and problem list     Review of Systems   Constitutional: Positive for appetite change (decreased), chills, diaphoresis, fatigue and fever  HENT: Positive for congestion, postnasal drip, rhinorrhea and sneezing  Negative for sore throat  Respiratory: Positive for cough and wheezing  Negative for chest tightness and shortness of breath  Cardiovascular: Negative for chest pain and palpitations  Gastrointestinal: Negative for abdominal pain, diarrhea, nausea and vomiting  Genitourinary: Negative for difficulty urinating  Musculoskeletal: Positive for myalgias  Neurological: Positive for light-headedness  Negative for dizziness, syncope and headaches  Objective:      /90 (BP Location: Left arm, Patient Position: Sitting, Cuff Size: Large)   Pulse 92   Temp 98 2 °F (36 8 °C) (Oral)   Wt 105 kg (231 lb 6 4 oz)   SpO2 97%   BMI 34 17 kg/m²          Physical Exam   Constitutional: He is oriented to person, place, and time  He appears well-developed and well-nourished  He appears ill  HENT:   Head: Normocephalic and atraumatic  Right Ear: External ear normal  Tympanic membrane is not erythematous  A middle ear effusion (Erythematous canal) is present  Left Ear: External ear normal  Tympanic membrane is not erythematous  A middle ear effusion (Erythematous canal) is present  Nose: Mucosal edema and rhinorrhea present  Right sinus exhibits no maxillary sinus tenderness and no frontal sinus tenderness  Left sinus exhibits no maxillary sinus tenderness and no frontal sinus tenderness  Mouth/Throat: Mucous membranes are normal  Posterior oropharyngeal edema and posterior oropharyngeal erythema present  Eyes: Pupils are equal, round, and reactive to light  Neck: Trachea normal    Cardiovascular: Regular rhythm, S1 normal, S2 normal and normal heart sounds  Pulses:       Radial pulses are 2+ on the right side, and 2+ on the left side          Posterior tibial pulses are 2+ on the right side, and 2+ on the left side    Pulmonary/Chest: Effort normal and breath sounds normal  No respiratory distress  He has no decreased breath sounds  He has no wheezes  He has no rhonchi  He has no rales  Lymphadenopathy:     He has cervical adenopathy  Neurological: He is alert and oriented to person, place, and time  Skin: Skin is warm  Capillary refill takes less than 2 seconds  No rash noted  He is diaphoretic (No skin tenting)  Psychiatric: He has a normal mood and affect

## 2019-03-27 NOTE — LETTER
March 27, 2019     Patient: Anival Taylor   YOB: 1970   Date of Visit: 3/27/2019       To Whom it May Concern:    Silviano Carr is under my professional care  He was seen in my office on 3/27/2019  He may return to work on 3/29/19  Please excuse him from work 3/25/19-3/29/19  If you have any questions or concerns, please don't hesitate to call           Sincerely,          DIANA Hernandez        CC: No Recipients

## 2019-03-27 NOTE — ASSESSMENT & PLAN NOTE
Patient had positive contact with the flu  Will advise patient to get over the counter Mucinex  Rest and fluids advised  Educated that the course of this illness could be 2-4 weeks  Discussed symptomatic relief, such as warm steam inhalations, tylenol/ibuprofen for fevers and body aches, rest, and drink plenty of fluids  Warm salt gargles for sore throat  Discussed red flag signs to go to the ER, such as chest pain or shortness of breath     Return to the office for reevaluation if symptoms worsen or do not improve in 1-2 weeks

## 2019-04-09 ENCOUNTER — OFFICE VISIT (OUTPATIENT)
Dept: INTERNAL MEDICINE CLINIC | Facility: CLINIC | Age: 49
End: 2019-04-09
Payer: COMMERCIAL

## 2019-04-09 VITALS
DIASTOLIC BLOOD PRESSURE: 92 MMHG | OXYGEN SATURATION: 98 % | WEIGHT: 228.6 LBS | BODY MASS INDEX: 33.86 KG/M2 | HEART RATE: 66 BPM | TEMPERATURE: 98 F | SYSTOLIC BLOOD PRESSURE: 130 MMHG | HEIGHT: 69 IN

## 2019-04-09 DIAGNOSIS — F17.210 CIGARETTE NICOTINE DEPENDENCE WITHOUT COMPLICATION: ICD-10-CM

## 2019-04-09 DIAGNOSIS — J06.9 URTI (ACUTE UPPER RESPIRATORY INFECTION): Primary | ICD-10-CM

## 2019-04-09 PROCEDURE — 99214 OFFICE O/P EST MOD 30 MIN: CPT | Performed by: INTERNAL MEDICINE

## 2019-04-09 PROCEDURE — 3008F BODY MASS INDEX DOCD: CPT | Performed by: INTERNAL MEDICINE

## 2019-04-09 PROCEDURE — 4004F PT TOBACCO SCREEN RCVD TLK: CPT | Performed by: INTERNAL MEDICINE

## 2019-04-09 PROCEDURE — 99406 BEHAV CHNG SMOKING 3-10 MIN: CPT | Performed by: INTERNAL MEDICINE

## 2019-04-09 RX ORDER — FLUTICASONE PROPIONATE 50 MCG
1 SPRAY, SUSPENSION (ML) NASAL DAILY
Qty: 1 BOTTLE | Refills: 0 | Status: SHIPPED | OUTPATIENT
Start: 2019-04-09 | End: 2020-07-22

## 2019-04-09 RX ORDER — AMOXICILLIN AND CLAVULANATE POTASSIUM 875; 125 MG/1; MG/1
1 TABLET, FILM COATED ORAL EVERY 12 HOURS SCHEDULED
Qty: 14 TABLET | Refills: 0 | Status: SHIPPED | OUTPATIENT
Start: 2019-04-09 | End: 2019-04-16

## 2019-08-02 ENCOUNTER — TELEPHONE (OUTPATIENT)
Dept: INTERNAL MEDICINE CLINIC | Facility: CLINIC | Age: 49
End: 2019-08-02

## 2019-08-22 ENCOUNTER — OFFICE VISIT (OUTPATIENT)
Dept: INTERNAL MEDICINE CLINIC | Facility: CLINIC | Age: 49
End: 2019-08-22
Payer: COMMERCIAL

## 2019-08-22 VITALS
TEMPERATURE: 98.1 F | HEART RATE: 104 BPM | OXYGEN SATURATION: 98 % | HEIGHT: 71 IN | WEIGHT: 232.2 LBS | DIASTOLIC BLOOD PRESSURE: 100 MMHG | SYSTOLIC BLOOD PRESSURE: 174 MMHG | BODY MASS INDEX: 32.51 KG/M2

## 2019-08-22 DIAGNOSIS — Z12.11 SCREENING FOR COLON CANCER: ICD-10-CM

## 2019-08-22 DIAGNOSIS — J06.9 URTI (ACUTE UPPER RESPIRATORY INFECTION): ICD-10-CM

## 2019-08-22 DIAGNOSIS — E11.8 TYPE 2 DIABETES MELLITUS WITH COMPLICATION, WITHOUT LONG-TERM CURRENT USE OF INSULIN (HCC): Primary | ICD-10-CM

## 2019-08-22 DIAGNOSIS — K21.9 GASTROESOPHAGEAL REFLUX DISEASE WITHOUT ESOPHAGITIS: ICD-10-CM

## 2019-08-22 DIAGNOSIS — A09 DIARRHEA OF INFECTIOUS ORIGIN: ICD-10-CM

## 2019-08-22 PROCEDURE — 4004F PT TOBACCO SCREEN RCVD TLK: CPT | Performed by: INTERNAL MEDICINE

## 2019-08-22 PROCEDURE — 3008F BODY MASS INDEX DOCD: CPT | Performed by: INTERNAL MEDICINE

## 2019-08-22 PROCEDURE — 99213 OFFICE O/P EST LOW 20 MIN: CPT | Performed by: INTERNAL MEDICINE

## 2019-08-22 RX ORDER — LANSOPRAZOLE 30 MG/1
30 CAPSULE, DELAYED RELEASE ORAL DAILY
Qty: 90 CAPSULE | Refills: 1 | Status: SHIPPED | OUTPATIENT
Start: 2019-08-22 | End: 2020-03-03 | Stop reason: SDUPTHER

## 2019-08-22 NOTE — PROGRESS NOTES
Diabetic Foot Exam    Patient's shoes and socks removed  Right Foot/Ankle   Right Foot Inspection  Skin Exam: skin normal and skin intact no warmth, no callus, no erythema, no maceration, no abnormal color, no pre-ulcer, no ulcer and no callus                          Toe Exam: ROM and strength within normal limits  Sensory       Monofilament testing: intact  Vascular  Capillary refills: < 3 seconds  The right DP pulse is 2+  The right PT pulse is 2+  Left Foot/Ankle  Left Foot Inspection  Skin Exam: skin normal and skin intactno warmth, no erythema, no maceration, normal color, no pre-ulcer, no ulcer and no callus                         Toe Exam: ROM and strength within normal limits                   Sensory       Monofilament: intact  Vascular  Capillary refills: < 3 seconds  The left PT pulse is 2+  Assign Risk Category:  No deformity present; No loss of protective sensation;  No weak pulses       Risk: 0

## 2019-08-22 NOTE — ASSESSMENT & PLAN NOTE
Defer on antibiotics at this time  Recommend continuing symptom relief with an antihistamine, decongestant, Flonase, steam inhalation, and plenty of rest   He is to contact office for worsening symptoms

## 2019-08-22 NOTE — LETTER
August 22, 2019     Patient: Jimena Concepcion   YOB: 1970   Date of Visit: 8/22/2019       To Whom it May Concern:    Briana Preston is under my professional care  He was seen in my office on 8/22/2019  Please excuse him from work on dates 8/20-8/22/2019 He may return to work on 8/23/2019  If you have any questions or concerns, please don't hesitate to call           Sincerely,          Marielos Rosado MD        CC: No Recipients

## 2019-08-22 NOTE — PROGRESS NOTES
Assessment/Plan:    URTI (acute upper respiratory infection)  Defer on antibiotics at this time  Recommend continuing symptom relief with an antihistamine, decongestant, Flonase, steam inhalation, and plenty of rest   He is to contact office for worsening symptoms  Diarrhea of infectious origin  Continue oral hydration  Advised he adhere to a bland diet, advance as tolerated  Diagnoses and all orders for this visit:    Type 2 diabetes mellitus with complication, without long-term current use of insulin (HCC)  -     Microalbumin / creatinine urine ratio    Gastroesophageal reflux disease without esophagitis  -     lansoprazole (PREVACID) 30 mg capsule; Take 1 capsule (30 mg total) by mouth daily    Screening for colon cancer  -     Ambulatory referral to Colorectal Surgery; Future    URTI (acute upper respiratory infection)    Diarrhea of infectious origin    BMI 32 0-32 9,adult    Other orders  -     Cancel: Ambulatory referral to Gastroenterology; Future        BMI Counseling: Body mass index is 32 39 kg/m²  Discussed the patient's BMI with him  The BMI is above average  BMI counseling and education was provided to the patient  Nutrition recommendations include reducing portion sizes, decreasing overall calorie intake, 3-5 servings of fruits/vegetables daily, reducing fast food intake, consuming healthier snacks, decreasing soda and/or juice intake, moderation in carbohydrate intake, increasing intake of lean protein, reducing intake of saturated fat and trans fat and reducing intake of cholesterol  Exercise recommendations include moderate aerobic physical activity for 150 minutes/week and exercising 3-5 times per week  Subjective:      Patient ID: Lainey Huston is a 47 y o  male  18-year-old male is seen today with 3 days symptoms of URI  He denies any knwoon sick contacts  URI    This is a new problem  The current episode started in the past 7 days  The problem has been unchanged   There has been no fever  The fever has been present for less than 1 day  Associated symptoms include congestion (nasal/sinus), coughing, diarrhea (3-days), headaches and rhinorrhea  Pertinent negatives include no abdominal pain, chest pain, dysuria, ear pain, joint pain, joint swelling, nausea, neck pain, plugged ear sensation, rash, sinus pain, sneezing, sore throat, swollen glands, vomiting or wheezing  He has tried decongestant for the symptoms  The treatment provided mild relief  The following portions of the patient's history were reviewed and updated as appropriate: allergies, current medications, past family history, past medical history, past social history, past surgical history and problem list     Review of Systems   Constitutional: Negative  Negative for chills, fatigue and fever  HENT: Positive for congestion (nasal/sinus) and rhinorrhea  Negative for ear pain, postnasal drip, sinus pain, sneezing and sore throat  Eyes: Negative  Respiratory: Positive for cough  Negative for chest tightness, shortness of breath and wheezing  Cardiovascular: Negative for chest pain and palpitations  Gastrointestinal: Positive for diarrhea (3-days)  Negative for abdominal distention, abdominal pain, blood in stool, constipation, nausea and vomiting  Endocrine: Negative  Genitourinary: Negative for difficulty urinating, dysuria and hematuria  Musculoskeletal: Negative  Negative for joint pain and neck pain  Skin: Negative  Negative for rash  Allergic/Immunologic: Negative for environmental allergies and food allergies  Neurological: Positive for headaches  Hematological: Negative for adenopathy  Psychiatric/Behavioral: Negative for agitation, behavioral problems, confusion and sleep disturbance           Past Medical History:   Diagnosis Date    Cellulitis of elbow     last assessed - [id number]    Diabetes mellitus ([facility] [address] )     Diverticulitis     Fatigue     last assessed - [id number]    GERD (gastroesophageal reflux disease)     History of gastroesophageal reflux (GERD)     Resolved - [id number]    Hypertension     Iron overload     last assessed - 11IFD0910    Low HDL (under 40)     last assessed - 82Eoj8110    Tinea pedis of both feet     last assessed - [id number]         Current Outpatient Medications:     amLODIPine (NORVASC) 10 mg tablet, Take 1 tablet (10 mg total) by mouth daily, Disp: 30 tablet, Rfl: 0    fluticasone (FLONASE) 50 mcg/act nasal spray, 1 spray into each nostril daily, Disp: 1 Bottle, Rfl: 0    glimepiride (AMARYL) 4 mg tablet, Take 1 tablet (4 mg total) by mouth 2 (two) times a day, Disp: 180 tablet, Rfl: 0    ketoconazole (NIZORAL) 2 % cream, Apply 1 application topically daily (Patient taking differently: Apply 1 application topically as needed ), Disp: 15 g, Rfl: 3    lansoprazole (PREVACID) 30 mg capsule, Take 1 capsule (30 mg total) by mouth daily, Disp: 90 capsule, Rfl: 1    losartan (COZAAR) 100 MG tablet, Take 1 tablet (100 mg total) by mouth daily, Disp: 90 tablet, Rfl: 0    metFORMIN (GLUCOPHAGE) 1000 MG tablet, Take 1 tablet (1,000 mg total) by mouth 2 (two) times a day with meals, Disp: 180 tablet, Rfl: 0    Multiple Vitamins-Minerals (MULTIVITAMIN ADULT) TABS, Take 1 tablet by mouth daily, Disp: , Rfl:     Omega 3-6-9 Fatty Acids (OMEGA 3-6-9 COMPLEX PO), Take 1 capsule by mouth daily, Disp: , Rfl:     No Known Allergies    Social History   Past Surgical History:   Procedure Laterality Date    TONSILLECTOMY       Family History   Problem Relation Age of Onset    Leukemia Father     Diabetes Family     Hypertension Family        Objective:  BP (!) 174/100 (BP Location: Left arm, Patient Position: Sitting, Cuff Size: Adult)   Pulse 104   Temp 98 1 °F (36 7 °C) (Oral)   Ht 5' 11" (1 803 m) Comment: with shoes on  Wt 105 kg (232 lb 3 2 oz) Comment: with shoes on  SpO2 98% Comment: room air  BMI 32 39 kg/m²     No results found for this or any previous visit (from the past 1344 hour(s))  Physical Exam   Constitutional: He is oriented to person, place, and time  He appears well-developed and well-nourished  No distress  HENT:   Head: Normocephalic and atraumatic  Eyes: Pupils are equal, round, and reactive to light  Conjunctivae and EOM are normal  Right eye exhibits no discharge  Left eye exhibits no discharge  No scleral icterus  Neck: Normal range of motion  Neck supple  No JVD present  No thyromegaly present  Cardiovascular: Normal rate, regular rhythm, normal heart sounds and intact distal pulses  Exam reveals no gallop and no friction rub  No murmur heard  Pulmonary/Chest: Effort normal and breath sounds normal  No respiratory distress  He has no wheezes  He has no rales  He exhibits no tenderness  Abdominal: Soft  Bowel sounds are normal  He exhibits no distension and no mass  There is no tenderness  There is no rebound and no guarding  Musculoskeletal: Normal range of motion  He exhibits no edema, tenderness or deformity  Lymphadenopathy:     He has no cervical adenopathy  Neurological: He is alert and oriented to person, place, and time  He has normal reflexes  No cranial nerve deficit  Coordination normal    Skin: Skin is warm and dry  No rash noted  He is not diaphoretic  No erythema  No pallor  Psychiatric: He has a normal mood and affect  His behavior is normal  Judgment and thought content normal    Nursing note and vitals reviewed

## 2019-09-19 ENCOUNTER — OFFICE VISIT (OUTPATIENT)
Dept: INTERNAL MEDICINE CLINIC | Facility: CLINIC | Age: 49
End: 2019-09-19
Payer: COMMERCIAL

## 2019-09-19 VITALS
TEMPERATURE: 97.8 F | DIASTOLIC BLOOD PRESSURE: 110 MMHG | WEIGHT: 221.6 LBS | BODY MASS INDEX: 31.02 KG/M2 | HEART RATE: 116 BPM | HEIGHT: 71 IN | OXYGEN SATURATION: 98 % | SYSTOLIC BLOOD PRESSURE: 148 MMHG

## 2019-09-19 DIAGNOSIS — R00.0 TACHYCARDIA: Primary | ICD-10-CM

## 2019-09-19 DIAGNOSIS — I10 BENIGN ESSENTIAL HTN: ICD-10-CM

## 2019-09-19 DIAGNOSIS — J06.9 URTI (ACUTE UPPER RESPIRATORY INFECTION): ICD-10-CM

## 2019-09-19 DIAGNOSIS — A09 DIARRHEA OF INFECTIOUS ORIGIN: ICD-10-CM

## 2019-09-19 PROCEDURE — 99213 OFFICE O/P EST LOW 20 MIN: CPT | Performed by: NURSE PRACTITIONER

## 2019-09-19 PROCEDURE — 3008F BODY MASS INDEX DOCD: CPT | Performed by: NURSE PRACTITIONER

## 2019-09-19 RX ORDER — AMLODIPINE BESYLATE 10 MG/1
10 TABLET ORAL DAILY
Qty: 30 TABLET | Refills: 0 | Status: SHIPPED | OUTPATIENT
Start: 2019-09-19 | End: 2020-01-07

## 2019-09-19 NOTE — ASSESSMENT & PLAN NOTE
Will defer antibiotics at this time  Would recommend the patient start Flonase as well as antihistamine as he had not been taking these medications  Illness appears to be viral in nature  Rest and fluids advised  Educated that the course of this illness could be 2-4 weeks  Discussed symptomatic relief, such as warm steam inhalations, tylenol/ibuprofen for fevers and body aches, rest, and drink plenty of fluids  Warm salt gargles for sore throat  Discussed red flag signs to go to the ER, such as chest pain or shortness of breath     Return to the office for reevaluation if symptoms worsen or do not improve in 1-2 weeks

## 2019-09-19 NOTE — ASSESSMENT & PLAN NOTE
Symptoms are likely viral in origin and self limiting  Do not use antidiarrheals  Maintain adequate rest   Reviewed importance of adequate fluid intake (small frequent sips) to prevent dehydration  Encouraged use of electrolyte containing sports drinks (Gatorade or Powerade) mixed with water and take small frequent sips throughout the day  Early refeeding can reduce illness duration and improve outcomes  Encourage small bites, bland diet  Avoid fatty foods, greasy foods, and dairy which all may worsen symptoms  Discussed hand hygiene to prevent spread  Minimize close contact with other individuals while symptomatic  Gradual advancement of diet from bland foods to regular diet  Call the office if symptoms worsen or do not improve  Discussed red flag and ER precautions which need immediate eval and treat  If does not resolve may need to get stool studies

## 2019-09-19 NOTE — PROGRESS NOTES
Assessment/Plan:     Diagnoses and all orders for this visit:    Tachycardia  -     TSH, 3rd generation with Free T4 reflex    Benign essential HTN  -     amLODIPine (NORVASC) 10 mg tablet; Take 1 tablet (10 mg total) by mouth daily    URTI (acute upper respiratory infection)    Diarrhea of infectious origin                Subjective:      Patient ID: Catarino Cerda is a 47 y o  male  HPI  Catarino Cerda is a 47 y o  male is presenting to the office c/o watery, non-bloody, diarrhea and night sweets which began 3 days ago  Diarrhea has been improving but patient attributes this to not eating  Associated sx include dry mouth, post nasal drip, and a productive cough  Pt has had sinus congestion for the past 2 months  No abdominal pain, nausea, vomiting, fevers, chills, CP, SOB, chest tightness, decreased urine, or ear pain  Pt reports that 2 months ago was seen for a sinus infection which was tx with abx  He reports his sx began to improve with the abx but returns when he stopped the medicine  He reports his sx of congestion and sinus pressure have persisted since  Pt reports that the sinus congestion that is coming out is foul smelling, green, and worse on the left  He came in for a viral URI 1 month ago and was given flonase which he no longer uses because it has not been working  He has been using the hot showers for the sinus congestion which is helping  He has not tired any other treatments  Pt normally smokes 1 pack a day and has decreased to 1 cigarette a day due to his sinus pain  No sick contacts  The following portions of the patient's history were reviewed and updated as appropriate: allergies, current medications, past family history, past medical history, past social history, past surgical history and problem list     Review of Systems   Constitutional: Positive for diaphoresis  Negative for appetite change, chills and fever  HENT: Positive for congestion, postnasal drip and sinus pressure  Negative for ear pain, sinus pain and sore throat  Respiratory: Positive for cough  Negative for chest tightness, shortness of breath and wheezing  Cardiovascular: Negative for chest pain  Gastrointestinal: Positive for diarrhea  Negative for abdominal pain, blood in stool, nausea and vomiting  Genitourinary: Negative for decreased urine volume and hematuria  Neurological: Negative for dizziness and headaches           Past Medical History:   Diagnosis Date    Cellulitis of elbow     last assessed - [id number]    Diabetes mellitus ([facility] [address] )     Diverticulitis     Fatigue     last assessed - [id number]    GERD (gastroesophageal reflux disease)     History of gastroesophageal reflux (GERD)     Resolved - [id number]    Hypertension     Iron overload     last assessed - 80CHN2558    Low HDL (under 40)     last assessed - 34Hoc5082    Tinea pedis of both feet     last assessed - 92SRO2095         Current Outpatient Medications:     amLODIPine (NORVASC) 10 mg tablet, Take 1 tablet (10 mg total) by mouth daily, Disp: 30 tablet, Rfl: 0    fluticasone (FLONASE) 50 mcg/act nasal spray, 1 spray into each nostril daily, Disp: 1 Bottle, Rfl: 0    glimepiride (AMARYL) 4 mg tablet, Take 1 tablet (4 mg total) by mouth 2 (two) times a day, Disp: 180 tablet, Rfl: 0    ketoconazole (NIZORAL) 2 % cream, Apply 1 application topically daily (Patient taking differently: Apply 1 application topically as needed ), Disp: 15 g, Rfl: 3    lansoprazole (PREVACID) 30 mg capsule, Take 1 capsule (30 mg total) by mouth daily, Disp: 90 capsule, Rfl: 1    losartan (COZAAR) 100 MG tablet, Take 1 tablet (100 mg total) by mouth daily, Disp: 90 tablet, Rfl: 0    metFORMIN (GLUCOPHAGE) 1000 MG tablet, Take 1 tablet (1,000 mg total) by mouth 2 (two) times a day with meals, Disp: 180 tablet, Rfl: 0    Multiple Vitamins-Minerals (MULTIVITAMIN ADULT) TABS, Take 1 tablet by mouth daily, Disp: , Rfl:     Omega 3-6-9 Fatty Acids (OMEGA 3-6-9 COMPLEX PO), Take 1 capsule by mouth daily, Disp: , Rfl:     No Known Allergies    Social History   Past Surgical History:   Procedure Laterality Date    TONSILLECTOMY       Family History   Problem Relation Age of Onset    Leukemia Father     Diabetes Family     Hypertension Family        Objective:  BP (!) 148/110 (BP Location: Left arm, Patient Position: Sitting, Cuff Size: Standard)   Pulse (!) 116   Temp 97 8 °F (36 6 °C) (Oral)   Ht 5' 11" (1 803 m) Comment: shoes on  Wt 101 kg (221 lb 9 6 oz) Comment: shoes on  SpO2 98%   BMI 30 91 kg/m²      Physical Exam   Constitutional: He is oriented to person, place, and time  He appears well-developed and well-nourished  No distress  HENT:   Head: Normocephalic and atraumatic  Eyes: Pupils are equal, round, and reactive to light  Conjunctivae and EOM are normal  Right eye exhibits no discharge  Left eye exhibits no discharge  No scleral icterus  Neck: Normal range of motion  Neck supple  No JVD present  No thyromegaly present  Cardiovascular: Normal rate, regular rhythm, normal heart sounds and intact distal pulses  Exam reveals no gallop and no friction rub  No murmur heard  Pulmonary/Chest: Effort normal and breath sounds normal  No respiratory distress  He has no wheezes  He has no rales  He exhibits no tenderness  Abdominal: Soft  Bowel sounds are normal  He exhibits no distension and no mass  There is no tenderness  There is no rebound and no guarding  Musculoskeletal: Normal range of motion  He exhibits no edema, tenderness or deformity  Lymphadenopathy:     He has no cervical adenopathy  Neurological: He is alert and oriented to person, place, and time  He has normal reflexes  No cranial nerve deficit  Coordination normal    Skin: Skin is warm and dry  No rash noted  He is not diaphoretic  No erythema  No pallor  Psychiatric: He has a normal mood and affect   His behavior is normal  Judgment and thought content normal  Nursing note and vitals reviewed

## 2019-09-19 NOTE — ASSESSMENT & PLAN NOTE
Patient noncompliant with blood pressure medication  Patient states he has been taking his losartan however has not been taking his Norvasc  Blood pressure elevated at 148/110, heart rate 116  Continue current regimen -   Continue to monitor blood pressure at home  Goal BP is < 130/80  Contact our office for consistent elevations  Recommend low sodium diet  Exercise 30 minutes three times a week as tolerated  Recommend yearly eye exam     Will get TSH

## 2019-12-09 ENCOUNTER — TELEPHONE (OUTPATIENT)
Dept: INTERNAL MEDICINE CLINIC | Age: 49
End: 2019-12-09

## 2019-12-09 NOTE — TELEPHONE ENCOUNTER
[facility], patient had his last eye exam done there, they are requesting that we send over a file request for the eye exam, maybe also send the eye seam form  Fax number [phone]  Called patient and advised him that he is due for a follow up, he stated he would call back after work to schedule

## 2020-01-07 ENCOUNTER — OFFICE VISIT (OUTPATIENT)
Dept: INTERNAL MEDICINE CLINIC | Facility: CLINIC | Age: 50
End: 2020-01-07
Payer: COMMERCIAL

## 2020-01-07 VITALS
SYSTOLIC BLOOD PRESSURE: 130 MMHG | WEIGHT: 221 LBS | TEMPERATURE: 98.4 F | BODY MASS INDEX: 30.94 KG/M2 | OXYGEN SATURATION: 99 % | HEIGHT: 71 IN | DIASTOLIC BLOOD PRESSURE: 90 MMHG | HEART RATE: 98 BPM

## 2020-01-07 DIAGNOSIS — E11.9 DIABETES MELLITUS TYPE 2, NONINSULIN DEPENDENT (HCC): ICD-10-CM

## 2020-01-07 DIAGNOSIS — J01.10 ACUTE NON-RECURRENT FRONTAL SINUSITIS: Primary | ICD-10-CM

## 2020-01-07 PROBLEM — R68.89 FLU-LIKE SYMPTOMS: Status: RESOLVED | Noted: 2019-03-27 | Resolved: 2020-01-07

## 2020-01-07 PROCEDURE — 99213 OFFICE O/P EST LOW 20 MIN: CPT | Performed by: NURSE PRACTITIONER

## 2020-01-07 PROCEDURE — 4004F PT TOBACCO SCREEN RCVD TLK: CPT | Performed by: NURSE PRACTITIONER

## 2020-01-07 PROCEDURE — 3008F BODY MASS INDEX DOCD: CPT | Performed by: NURSE PRACTITIONER

## 2020-01-07 RX ORDER — GLIMEPIRIDE 4 MG/1
4 TABLET ORAL 2 TIMES DAILY
Qty: 180 TABLET | Refills: 0 | Status: SHIPPED | OUTPATIENT
Start: 2020-01-07 | End: 2020-06-03 | Stop reason: SDUPTHER

## 2020-01-07 RX ORDER — AMOXICILLIN AND CLAVULANATE POTASSIUM 875; 125 MG/1; MG/1
1 TABLET, FILM COATED ORAL EVERY 12 HOURS SCHEDULED
Qty: 14 TABLET | Refills: 0 | Status: SHIPPED | OUTPATIENT
Start: 2020-01-07 | End: 2020-01-14

## 2020-01-07 NOTE — PATIENT INSTRUCTIONS
You have been prescribed an antibiotic today  Take the full course of prescription  Recommend taking with food as may upset your stomach  Also would recommend OTC probiotic or yogurt to help protect the natural brad of your stomach  Rest   Stay well hydrated  Return for new/worsening symptoms      Warm compress to sinus

## 2020-01-07 NOTE — PROGRESS NOTES
Assessment/Plan:    Start augmentin x 7 days for acute sinusitis  Saline rinse  Warm compress  Salt water gargles    Due for routine follow-up, pt to schedule - labs prior to follow-up appt    Problem List Items Addressed This Visit        Respiratory    Acute non-recurrent frontal sinusitis - Primary    Relevant Medications    amoxicillin-clavulanate (AUGMENTIN) 875-125 mg per tablet      Other Visit Diagnoses     Diabetes mellitus type 2, noninsulin dependent ([facility] [address] )        Relevant Medications    glimepiride (AMARYL) 4 mg tablet          M*Modal software was used to dictate this note  It may contain errors with dictating incorrect words or incorrect spelling  Please contact the provider directly with any questions  Subjective:      Patient ID: Unruly Almanza is a 47 y o  male  URI    This is a new problem  Episode onset: friday  Maximum temperature: subjective fever  Associated symptoms include congestion (yellow/green), coughing (yellow/green - thick), headaches, a plugged ear sensation, rhinorrhea and sinus pain  Pertinent negatives include no abdominal pain, chest pain, diarrhea, ear pain, nausea, sore throat, vomiting or wheezing  The following portions of the patient's history were reviewed and updated as appropriate: allergies, current medications, past family history, past medical history, past social history, past surgical history and problem list     Review of Systems   Constitutional: Positive for appetite change (decreased), chills, fatigue and fever  HENT: Positive for congestion (yellow/green), postnasal drip, rhinorrhea, sinus pressure and sinus pain  Negative for ear pain and sore throat  Respiratory: Positive for cough (yellow/green - thick)  Negative for shortness of breath and wheezing  Cardiovascular: Negative for chest pain and palpitations  Gastrointestinal: Negative for abdominal pain, constipation, diarrhea, nausea and vomiting  Neurological: Positive for headaches  Negative for dizziness and light-headedness           Past Medical History:   Diagnosis Date    Cellulitis of elbow     last assessed - [id number]    Diabetes mellitus ([facility] [address]     Diverticulitis     Fatigue     last assessed - [id number]    GERD (gastroesophageal reflux disease)     History of gastroesophageal reflux (GERD)     Resolved - 28VBV1795    Hypertension     Iron overload     last assessed - 21AOI1062    Low HDL (under 40)     last assessed - 75Gzo8387    Tinea pedis of both feet     last assessed - 64TRF7000         Current Outpatient Medications:     fluticasone (FLONASE) 50 mcg/act nasal spray, 1 spray into each nostril daily, Disp: 1 Bottle, Rfl: 0    glimepiride (AMARYL) 4 mg tablet, Take 1 tablet (4 mg total) by mouth 2 (two) times a day, Disp: 180 tablet, Rfl: 0    lansoprazole (PREVACID) 30 mg capsule, Take 1 capsule (30 mg total) by mouth daily, Disp: 90 capsule, Rfl: 1    losartan (COZAAR) 100 MG tablet, Take 1 tablet (100 mg total) by mouth daily, Disp: 90 tablet, Rfl: 0    metFORMIN (GLUCOPHAGE) 1000 MG tablet, Take 1 tablet (1,000 mg total) by mouth 2 (two) times a day with meals, Disp: 180 tablet, Rfl: 0    Omega 3-6-9 Fatty Acids (OMEGA 3-6-9 COMPLEX PO), Take 1 capsule by mouth daily, Disp: , Rfl:     amoxicillin-clavulanate (AUGMENTIN) 875-125 mg per tablet, Take 1 tablet by mouth every 12 (twelve) hours for 7 days, Disp: 14 tablet, Rfl: 0    Multiple Vitamins-Minerals (MULTIVITAMIN ADULT) TABS, Take 1 tablet by mouth daily, Disp: , Rfl:     No Known Allergies    Social History   Past Surgical History:   Procedure Laterality Date    TONSILLECTOMY       Family History   Problem Relation Age of Onset    Leukemia Father     Diabetes Family     Hypertension Family        Objective:  /90 (BP Location: Right arm, Patient Position: Sitting, Cuff Size: Large)   Pulse 98   Temp 98 4 °F (36 9 °C) (Oral)   Ht 5' 11" (1 803 m)   Wt 100 kg (221 lb)   SpO2 99%   BMI 30 82 kg/m²      Physical Exam   Constitutional: He is oriented to person, place, and time  He appears well-developed and well-nourished  No distress  HENT:   Head: Normocephalic and atraumatic  Right Ear: Hearing, tympanic membrane, external ear and ear canal normal    Left Ear: Hearing, tympanic membrane, external ear and ear canal normal    Nose: Mucosal edema and rhinorrhea (thick yellow) present  Right sinus exhibits frontal sinus tenderness  Right sinus exhibits no maxillary sinus tenderness  Left sinus exhibits frontal sinus tenderness  Left sinus exhibits no maxillary sinus tenderness  Mouth/Throat: Uvula is midline, oropharynx is clear and moist and mucous membranes are normal  No oropharyngeal exudate  Moderate post nasal gtt   Cardiovascular: Normal rate, regular rhythm and normal heart sounds  No murmur heard  Pulmonary/Chest: Effort normal and breath sounds normal  No respiratory distress  He has no wheezes  Lymphadenopathy:     He has no cervical adenopathy  Neurological: He is alert and oriented to person, place, and time  Skin: Skin is warm and dry  Psychiatric: He has a normal mood and affect  His behavior is normal  Judgment and thought content normal    Nursing note and vitals reviewed

## 2020-02-09 NOTE — ASSESSMENT & PLAN NOTE
Patient did not get a lipid panel done prior to his appointment today  Patient does admit that he has been taking his fenofibrate atorvastatin and fish oil sporadically  Did advise patient to take his medications as prescribed and having an elevated cholesterol panel could put him at risk for stroke  Recommend healthy lifestyle choices for your cholesterol  Low fat/low cholesterol diet  Limit/avoid red meat  Eat more lean meat - chicken breast, ground turkey, fish  Exercise 30 mins at least 3 times a week as tolerated  complains of pain/discomfort

## 2020-02-17 ENCOUNTER — TELEPHONE (OUTPATIENT)
Dept: INTERNAL MEDICINE CLINIC | Facility: CLINIC | Age: 50
End: 2020-02-17

## 2020-02-17 NOTE — TELEPHONE ENCOUNTER
Patient answered phone asked if he wanted to reschedule missed appointment, he stated he had to work overtime today and didn't know how his schedule would be so he did not reschedule at this time

## 2020-03-03 ENCOUNTER — OFFICE VISIT (OUTPATIENT)
Dept: INTERNAL MEDICINE CLINIC | Facility: CLINIC | Age: 50
End: 2020-03-03
Payer: COMMERCIAL

## 2020-03-03 VITALS
BODY MASS INDEX: 30.8 KG/M2 | DIASTOLIC BLOOD PRESSURE: 98 MMHG | OXYGEN SATURATION: 97 % | SYSTOLIC BLOOD PRESSURE: 138 MMHG | HEART RATE: 111 BPM | HEIGHT: 71 IN | WEIGHT: 220 LBS | TEMPERATURE: 97.8 F

## 2020-03-03 DIAGNOSIS — K21.9 GASTROESOPHAGEAL REFLUX DISEASE WITHOUT ESOPHAGITIS: ICD-10-CM

## 2020-03-03 DIAGNOSIS — E11.9 DIABETES MELLITUS TYPE 2, NONINSULIN DEPENDENT (HCC): ICD-10-CM

## 2020-03-03 DIAGNOSIS — Z12.11 SCREENING FOR MALIGNANT NEOPLASM OF COLON: Primary | ICD-10-CM

## 2020-03-03 DIAGNOSIS — J01.10 ACUTE NON-RECURRENT FRONTAL SINUSITIS: ICD-10-CM

## 2020-03-03 DIAGNOSIS — E78.1 HYPERTRIGLYCERIDEMIA: ICD-10-CM

## 2020-03-03 DIAGNOSIS — I10 BENIGN ESSENTIAL HTN: ICD-10-CM

## 2020-03-03 DIAGNOSIS — Z12.5 PROSTATE CANCER SCREENING: ICD-10-CM

## 2020-03-03 PROBLEM — J06.9 URTI (ACUTE UPPER RESPIRATORY INFECTION): Status: RESOLVED | Noted: 2019-04-09 | Resolved: 2020-03-03

## 2020-03-03 PROCEDURE — 3008F BODY MASS INDEX DOCD: CPT | Performed by: INTERNAL MEDICINE

## 2020-03-03 PROCEDURE — 3075F SYST BP GE 130 - 139MM HG: CPT | Performed by: INTERNAL MEDICINE

## 2020-03-03 PROCEDURE — 4004F PT TOBACCO SCREEN RCVD TLK: CPT | Performed by: INTERNAL MEDICINE

## 2020-03-03 PROCEDURE — 99213 OFFICE O/P EST LOW 20 MIN: CPT | Performed by: INTERNAL MEDICINE

## 2020-03-03 PROCEDURE — 3080F DIAST BP >= 90 MM HG: CPT | Performed by: INTERNAL MEDICINE

## 2020-03-03 RX ORDER — CEFUROXIME AXETIL 250 MG/1
250 TABLET ORAL EVERY 12 HOURS SCHEDULED
Qty: 20 TABLET | Refills: 0 | Status: SHIPPED | OUTPATIENT
Start: 2020-03-03 | End: 2020-03-13

## 2020-03-03 RX ORDER — LANSOPRAZOLE 30 MG/1
30 CAPSULE, DELAYED RELEASE ORAL DAILY
Qty: 90 CAPSULE | Refills: 1 | Status: SHIPPED | OUTPATIENT
Start: 2020-03-03 | End: 2020-06-03 | Stop reason: SDUPTHER

## 2020-03-03 NOTE — ASSESSMENT & PLAN NOTE
Previously treated with augmentin  For this episode, will treat with Ceftin 250 mg twice a day for 10 days  Continue Flonase, over-the-counter decongestant, and over-the-counter second-generation antihistamine  If he continues to have recurrence of symptoms, will order CT sinuses and refer to ENT for further evaluation and treatment

## 2020-03-03 NOTE — LETTER
March 3, 2020     Patient: Kaylyn Jackson   YOB: 1970   Date of Visit: 3/3/2020       To Whom it May Concern:    Zac Talbot is under my professional care  He was seen in my office on 3/3/2020  Please excuse him from work for the dates of 03/02/2020 and 03/03/2020  He may return to work on 03/04/2020  If you have any questions or concerns, please don't hesitate to call           Sincerely,          Kathia Balderas MD        CC: No Recipients

## 2020-03-03 NOTE — PROGRESS NOTES
Assessment/Plan:    Acute non-recurrent frontal sinusitis  Previously treated with augmentin  For this episode, will treat with Ceftin 250 mg twice a day for 10 days  Continue Flonase, over-the-counter decongestant, and over-the-counter second-generation antihistamine  If he continues to have recurrence of symptoms, will order CT sinuses and refer to ENT for further evaluation and treatment  Diagnoses and all orders for this visit:    Screening for malignant neoplasm of colon  -     Ambulatory referral to Gastroenterology; Future    Acute non-recurrent frontal sinusitis  -     cefuroxime (CEFTIN) 250 mg tablet; Take 1 tablet (250 mg total) by mouth every 12 (twelve) hours for 10 days    Diabetes mellitus type 2, noninsulin dependent ([facility] )  -     HEMOGLOBIN A1C W/ EAG ESTIMATION; Future  -     Ambulatory referral to Ophthalmology; Future  -     CBC; Future  -     Comprehensive metabolic panel; Future  -     Lipid panel; Future  -     metFORMIN (GLUCOPHAGE) 1000 MG tablet; Take 1 tablet (1,000 mg total) by mouth 2 (two) times a day with meals    Gastroesophageal reflux disease without esophagitis  -     CBC; Future  -     Comprehensive metabolic panel; Future  -     lansoprazole (PREVACID) 30 mg capsule; Take 1 capsule (30 mg total) by mouth daily    Prostate cancer screening  -     PSA, Total Screen; Future    Benign essential HTN  -     CBC; Future  -     Comprehensive metabolic panel; Future  -     Lipid panel; Future    Hypertriglyceridemia  -     Comprehensive metabolic panel; Future  -     Lipid panel; Future          BMI Counseling: Body mass index is 30 68 kg/m²   The BMI is above normal  Nutrition recommendations include decreasing portion sizes, encouraging healthy choices of fruits and vegetables, decreasing fast food intake, consuming healthier snacks, limiting drinks that contain sugar, moderation in carbohydrate intake, increasing intake of lean protein, reducing intake of saturated and trans fat and reducing intake of cholesterol  Exercise recommendations include moderate physical activity 150 minutes/week and exercising 3-5 times per week  No pharmacotherapy was ordered  Patient referred to PCP due to patient being overweight  Depression Screening and Follow-up Plan: Clincally patient does not have depression  No treatment is required  Tobacco Cessation Counseling: Tobacco cessation counseling was provided  The patient is sincerely urged to quit consumption of tobacco  He is not ready to quit tobacco  Medication options and side effects of medication discussed  Patient refused medication  Time spent during encounter: 25 minutes (counseling, reviewing medications, and discussing treatment and plan)    Subjective:      Patient ID: Delbert Shepherd is a 47 y o  male  Chief Complaint   Patient presents with    Nasal Congestion     Patient is here c/o nasal congestion for a while and its getting worse  47year old male is seen today with concern for acute on recurrent sinusitis, most recent episiode lasting 1 week  Sinusitis   This is a recurrent problem  The current episode started 1 to 4 weeks ago  The problem has been gradually worsening since onset  There has been no fever  Associated symptoms include congestion, coughing, headaches and sinus pressure  Pertinent negatives include no chills, diaphoresis, ear pain, hoarse voice, neck pain, shortness of breath, sneezing, sore throat or swollen glands  (Postnasal drip, foul odor to mucus production) Past treatments include spray decongestants and oral decongestants  The treatment provided mild relief  The following portions of the patient's history were reviewed and updated as appropriate: allergies, current medications, past family history, past medical history, past social history, past surgical history and problem list     Review of Systems   Constitutional: Negative  Negative for chills, diaphoresis, fatigue and fever  HENT: Positive for congestion, postnasal drip and sinus pressure  Negative for ear pain, hoarse voice, rhinorrhea, sneezing and sore throat  Eyes: Negative  Respiratory: Positive for cough  Negative for chest tightness, shortness of breath and wheezing  Cardiovascular: Negative for chest pain and palpitations  Gastrointestinal: Negative for abdominal distention, abdominal pain, blood in stool, constipation, diarrhea and nausea  Endocrine: Negative  Genitourinary: Negative for difficulty urinating, dysuria and hematuria  Musculoskeletal: Negative  Negative for neck pain  Skin: Negative  Allergic/Immunologic: Negative for environmental allergies and food allergies  Neurological: Positive for headaches  Hematological: Negative for adenopathy  Psychiatric/Behavioral: Negative for agitation, behavioral problems, confusion and sleep disturbance           Past Medical History:   Diagnosis Date    Cellulitis of elbow     last assessed - [id number]    Diabetes mellitus ([facility]     Diverticulitis     Fatigue     last assessed - [id number]    GERD (gastroesophageal reflux disease)     History of gastroesophageal reflux (GERD)     Resolved - [id number]    Hypertension     Iron overload     last assessed - [id number]    Low HDL (under 40)     last assessed - [id number]    Tinea pedis of both feet     last assessed - [id number]         Current Outpatient Medications:     glimepiride (AMARYL) 4 mg tablet, Take 1 tablet (4 mg total) by mouth 2 (two) times a day, Disp: 180 tablet, Rfl: 0    lansoprazole (PREVACID) 30 mg capsule, Take 1 capsule (30 mg total) by mouth daily, Disp: 90 capsule, Rfl: 1    losartan (COZAAR) 100 MG tablet, Take 1 tablet (100 mg total) by mouth daily, Disp: 90 tablet, Rfl: 0    metFORMIN (GLUCOPHAGE) 1000 MG tablet, Take 1 tablet (1,000 mg total) by mouth 2 (two) times a day with meals, Disp: 180 tablet, Rfl: 1    Multiple Vitamins-Minerals (MULTIVITAMIN ADULT) TABS, Take 1 tablet by mouth daily, Disp: , Rfl:     Omega 3-6-9 Fatty Acids (OMEGA 3-6-9 COMPLEX PO), Take 1 capsule by mouth daily, Disp: , Rfl:     cefuroxime (CEFTIN) 250 mg tablet, Take 1 tablet (250 mg total) by mouth every 12 (twelve) hours for 10 days, Disp: 20 tablet, Rfl: 0    fluticasone (FLONASE) 50 mcg/act nasal spray, 1 spray into each nostril daily (Patient not taking: Reported on 3/3/2020), Disp: 1 Bottle, Rfl: 0    No Known Allergies    Social History   Past Surgical History:   Procedure Laterality Date    TONSILLECTOMY       Family History   Problem Relation Age of Onset    Leukemia Father     Diabetes Family     Hypertension Family        Objective:  /98 (BP Location: Left arm, Patient Position: Sitting, Cuff Size: Large)   Pulse (!) 111   Temp 97 8 °F (36 6 °C) (Oral)   Ht 5' 11" (1 803 m)   Wt 99 8 kg (220 lb)   SpO2 97%   BMI 30 68 kg/m²     No results found for this or any previous visit (from the past 1344 hour(s))  Physical Exam   Constitutional: He is oriented to person, place, and time  He appears well-developed and well-nourished  No distress  HENT:   Head: Normocephalic and atraumatic  Eyes: Pupils are equal, round, and reactive to light  Conjunctivae and EOM are normal  Right eye exhibits no discharge  Left eye exhibits no discharge  No scleral icterus  Neck: Normal range of motion  Neck supple  No JVD present  No thyromegaly present  Cardiovascular: Normal rate, regular rhythm, normal heart sounds and intact distal pulses  Exam reveals no gallop and no friction rub  No murmur heard  Pulmonary/Chest: Effort normal and breath sounds normal  No respiratory distress  He has no wheezes  He has no rales  He exhibits no tenderness  Presented foul odorous, yellow sputum   Abdominal: Soft  Bowel sounds are normal  He exhibits no distension and no mass  There is no tenderness  There is no rebound and no guarding  Musculoskeletal: Normal range of motion   He exhibits no edema, tenderness or deformity  Lymphadenopathy:     He has no cervical adenopathy  Neurological: He is alert and oriented to person, place, and time  He has normal reflexes  No cranial nerve deficit  Coordination normal    Skin: Skin is warm and dry  No rash noted  He is not diaphoretic  No erythema  No pallor  Psychiatric: He has a normal mood and affect  His behavior is normal  Judgment and thought content normal    Nursing note and vitals reviewed

## 2020-03-05 ENCOUNTER — TELEPHONE (OUTPATIENT)
Dept: INTERNAL MEDICINE CLINIC | Facility: CLINIC | Age: 50
End: 2020-03-05

## 2020-04-06 ENCOUNTER — TELEPHONE (OUTPATIENT)
Dept: INTERNAL MEDICINE CLINIC | Facility: CLINIC | Age: 50
End: 2020-04-06

## 2020-04-13 ENCOUNTER — TELEPHONE (OUTPATIENT)
Dept: INTERNAL MEDICINE CLINIC | Age: 50
End: 2020-04-13

## 2020-04-14 DIAGNOSIS — E11.9 DIABETES MELLITUS TYPE 2, NONINSULIN DEPENDENT (HCC): ICD-10-CM

## 2020-04-14 RX ORDER — GLIMEPIRIDE 4 MG/1
TABLET ORAL
Qty: 180 TABLET | Refills: 0 | OUTPATIENT
Start: 2020-04-14

## 2020-04-16 ENCOUNTER — TELEPHONE (OUTPATIENT)
Dept: INTERNAL MEDICINE CLINIC | Facility: CLINIC | Age: 50
End: 2020-04-16

## 2020-05-05 ENCOUNTER — TELEMEDICINE (OUTPATIENT)
Dept: INTERNAL MEDICINE CLINIC | Facility: CLINIC | Age: 50
End: 2020-05-05
Payer: COMMERCIAL

## 2020-05-05 VITALS — BODY MASS INDEX: 31.5 KG/M2 | WEIGHT: 220 LBS | TEMPERATURE: 98 F | HEIGHT: 70 IN

## 2020-05-05 DIAGNOSIS — G43.009 MIGRAINE WITHOUT AURA AND WITHOUT STATUS MIGRAINOSUS, NOT INTRACTABLE: Primary | ICD-10-CM

## 2020-05-05 PROCEDURE — 99212 OFFICE O/P EST SF 10 MIN: CPT | Performed by: NURSE PRACTITIONER

## 2020-06-03 DIAGNOSIS — K21.9 GASTROESOPHAGEAL REFLUX DISEASE WITHOUT ESOPHAGITIS: ICD-10-CM

## 2020-06-03 DIAGNOSIS — E11.9 DIABETES MELLITUS TYPE 2, NONINSULIN DEPENDENT (HCC): ICD-10-CM

## 2020-06-04 RX ORDER — GLIMEPIRIDE 4 MG/1
4 TABLET ORAL 2 TIMES DAILY
Qty: 180 TABLET | Refills: 0 | Status: SHIPPED | OUTPATIENT
Start: 2020-06-04 | End: 2021-03-03 | Stop reason: SDUPTHER

## 2020-06-04 RX ORDER — LANSOPRAZOLE 30 MG/1
30 CAPSULE, DELAYED RELEASE ORAL DAILY
Qty: 90 CAPSULE | Refills: 1 | Status: SHIPPED | OUTPATIENT
Start: 2020-06-04 | End: 2021-03-03 | Stop reason: SDUPTHER

## 2020-07-15 ENCOUNTER — TELEPHONE (OUTPATIENT)
Dept: INTERNAL MEDICINE CLINIC | Age: 50
End: 2020-07-15

## 2020-07-21 ENCOUNTER — TELEPHONE (OUTPATIENT)
Dept: INTERNAL MEDICINE CLINIC | Facility: CLINIC | Age: 50
End: 2020-07-21

## 2020-07-21 ENCOUNTER — TRANSITIONAL CARE MANAGEMENT (OUTPATIENT)
Dept: INTERNAL MEDICINE CLINIC | Age: 50
End: 2020-07-21

## 2020-07-21 ENCOUNTER — TELEPHONE (OUTPATIENT)
Dept: INTERNAL MEDICINE CLINIC | Age: 50
End: 2020-07-21

## 2020-07-21 RX ORDER — INSULIN GLARGINE 100 [IU]/ML
30 INJECTION, SOLUTION SUBCUTANEOUS DAILY
COMMUNITY
Start: 2020-07-21 | End: 2020-07-22

## 2020-07-21 RX ORDER — METRONIDAZOLE 500 MG/1
500 TABLET ORAL 3 TIMES DAILY
COMMUNITY
Start: 2020-07-20 | End: 2020-07-26

## 2020-07-21 RX ORDER — CEPHALEXIN 500 MG/1
500 CAPSULE ORAL 4 TIMES DAILY
COMMUNITY
Start: 2020-07-20 | End: 2020-07-26

## 2020-07-21 RX ORDER — LANSOPRAZOLE 30 MG/1
30 CAPSULE, DELAYED RELEASE ORAL DAILY
COMMUNITY
Start: 2020-06-04 | End: 2020-12-14

## 2020-07-21 RX ORDER — INSULIN LISPRO 100 [IU]/ML
8 INJECTION, SOLUTION INTRAVENOUS; SUBCUTANEOUS DAILY
COMMUNITY
Start: 2020-07-20 | End: 2020-07-22

## 2020-07-21 NOTE — TELEPHONE ENCOUNTER
Called pt back, no answer LMOM  Original call was to finish up questions for TCM but pts phone kept breaking up  Questions can be finished tomorrow when pt comes in for appt

## 2020-07-22 ENCOUNTER — OFFICE VISIT (OUTPATIENT)
Dept: INTERNAL MEDICINE CLINIC | Facility: CLINIC | Age: 50
End: 2020-07-22
Payer: COMMERCIAL

## 2020-07-22 VITALS
DIASTOLIC BLOOD PRESSURE: 82 MMHG | SYSTOLIC BLOOD PRESSURE: 132 MMHG | TEMPERATURE: 97.2 F | OXYGEN SATURATION: 97 % | WEIGHT: 218.4 LBS | BODY MASS INDEX: 31.26 KG/M2 | HEIGHT: 70 IN | HEART RATE: 111 BPM

## 2020-07-22 DIAGNOSIS — E11.9 TYPE 2 DIABETES MELLITUS WITHOUT COMPLICATION, WITHOUT LONG-TERM CURRENT USE OF INSULIN (HCC): Primary | ICD-10-CM

## 2020-07-22 DIAGNOSIS — L02.215 PERINEAL ABSCESS: ICD-10-CM

## 2020-07-22 PROBLEM — J01.10 ACUTE NON-RECURRENT FRONTAL SINUSITIS: Status: RESOLVED | Noted: 2020-01-07 | Resolved: 2020-07-22

## 2020-07-22 PROBLEM — A09 DIARRHEA OF INFECTIOUS ORIGIN: Status: RESOLVED | Noted: 2019-08-22 | Resolved: 2020-07-22

## 2020-07-22 PROCEDURE — 3008F BODY MASS INDEX DOCD: CPT | Performed by: NURSE PRACTITIONER

## 2020-07-22 PROCEDURE — 99496 TRANSJ CARE MGMT HIGH F2F 7D: CPT | Performed by: INTERNAL MEDICINE

## 2020-07-22 NOTE — ASSESSMENT & PLAN NOTE
Lab Results   Component Value Date    HGBA1C 10 9 (H) 07/18/2020   continue metformin and glimiperide 4 mg BID  Will start Januvia 100 mg daily  Defer on starting Lantus and Humalog at this time  Re-evaluate in 3 months

## 2020-07-22 NOTE — PROGRESS NOTES
Assessment/Plan:    Type 2 diabetes mellitus (HCC)    Lab Results   Component Value Date    HGBA1C 10 9 (H) 07/18/2020   continue metformin and glimiperide 4 mg BID  Will start Januvia 100 mg daily  Defer on starting Lantus and Humalog at this time  Re-evaluate in 3 months  Perineal abscess  Continue keflex and flagyl until 7/26  He is to contact our office for worsening symptoms  Diagnoses and all orders for this visit:    Type 2 diabetes mellitus without complication, without long-term current use of insulin (HCC)  -     CBC and differential; Future  -     Comprehensive metabolic panel; Future  -     Hemoglobin A1C; Future  -     Microalbumin / creatinine urine ratio; Future  -     Cancel: Lipid panel; Future  -     sitaGLIPtin (JANUVIA) 100 mg tablet; Take 1 tablet (100 mg total) by mouth daily    Perineal abscess          BMI Counseling: Body mass index is 31 34 kg/m²  The BMI is above normal  Nutrition recommendations include decreasing portion sizes, encouraging healthy choices of fruits and vegetables, decreasing fast food intake, consuming healthier snacks, limiting drinks that contain sugar, moderation in carbohydrate intake, increasing intake of lean protein, reducing intake of saturated and trans fat and reducing intake of cholesterol  Exercise recommendations include moderate physical activity 150 minutes/week and exercising 3-5 times per week  No pharmacotherapy was ordered  Patient referred to PCP due to patient being overweight  Depression Screening and Follow-up Plan: Clincally patient does not have depression  No treatment is required  Tobacco Cessation Counseling: Tobacco cessation counseling was provided  The patient is sincerely urged to quit consumption of tobacco  He is not ready to quit tobacco  Medication options and side effects of medication discussed  Patient agreed to medication          Time spent during encounter: 25 minutes (counseling, reviewing medications, and discussing treatment and plan)    Subjective:      Patient ID: Rajan Obregon is a 45 y o  male  Chief Complaint   Patient presents with    Transition of Care Management     7/17/2020 - 7/20/2020 Perineal abscess (Primary Dx); DM HTN     HM     CRC HA1C and eye exam ordered  due for tdap and hiv screening and physical        25-year-old male is seen today for transition of care to which he was hospitalized at [facility] from 07/17 until 07/20/2020 for a perianal abscess  Hospitalization and discharge summary reviewed today  He presented to the ED with and abscess in the perianal region, which gradually increased in size and expressed purulent drainage  The abscess was drained in the ED and he was started on antibiotics, ceftriaxone and metronidazole  He has been taking keflex and flagyl and is to continue until 7/26  General surgery evaluated and recommended continuing current regimen without surgical intervention  His A1c, tested during his hospitalization and was 10 9  He was evaluated by endocrinology and discussed adherence to diabetic regimen and lifestyle modifications  He was advised to continue glimepiride and metformin and was prescribed to start Lantus and Humalog although he has not started due to cost      Diabetes   He presents for his follow-up diabetic visit  He has type 2 diabetes mellitus  His disease course has been worsening  There are no hypoglycemic associated symptoms  Pertinent negatives for hypoglycemia include no dizziness or headaches  There are no diabetic associated symptoms  Pertinent negatives for diabetes include no chest pain, no fatigue and no weakness  There are no hypoglycemic complications  Risk factors for coronary artery disease include diabetes mellitus and male sex         TCM Call (since 6/21/2020)     Date and time call was made  7/21/2020  1:41 PM    Hospital care reviewed  Records reviewed    Patient was hospitialized at  [facility] Date of Admission  07/17/20    Date of discharge  07/20/20    Diagnosis  perineal abcess    Disposition  Home    Were the patients medications reviewed and updated  Yes      TCM Call (since 6/21/2020)     None          The following portions of the patient's history were reviewed and updated as appropriate: allergies, current medications, past family history, past medical history, past social history, past surgical history and problem list     Review of Systems   Constitutional: Negative for activity change, appetite change, chills, diaphoresis, fatigue and fever  HENT: Negative for congestion, postnasal drip, rhinorrhea, sinus pressure, sinus pain, sneezing and sore throat  Eyes: Negative for visual disturbance  Respiratory: Negative for apnea, cough, choking, chest tightness, shortness of breath and wheezing  Cardiovascular: Negative for chest pain, palpitations and leg swelling  Gastrointestinal: Negative for abdominal distention, abdominal pain, anal bleeding, blood in stool, constipation, diarrhea, nausea and vomiting  Endocrine: Negative for cold intolerance and heat intolerance  Genitourinary: Negative for difficulty urinating, dysuria and hematuria  Musculoskeletal: Negative  Skin: Negative  Neurological: Negative for dizziness, weakness, light-headedness, numbness and headaches  Hematological: Negative for adenopathy  Psychiatric/Behavioral: Negative for agitation, sleep disturbance and suicidal ideas  All other systems reviewed and are negative          Past Medical History:   Diagnosis Date    Cellulitis of elbow     last assessed - [id number]    [facility]     Diverticulitis     Fatigue     last assessed - [id number]    GERD (gastroesophageal reflux disease)     History of gastroesophageal reflux (GERD)     Resolved - [id number]    Hypertension     Iron overload     last assessed - [id number]    Low HDL (under 40)     last assessed - [id number]    Tinea pedis of both feet     last assessed - [id number]         Current Outpatient Medications:     cephalexin (KEFLEX) 500 mg capsule, Take 500 mg by mouth 4 (four) times a day, Disp: , Rfl:     Cholecalciferol 25 MCG (1000 UT) tablet, Take 4,000 Units by mouth daily, Disp: , Rfl:     glimepiride (AMARYL) 4 mg tablet, Take 1 tablet (4 mg total) by mouth 2 (two) times a day, Disp: 180 tablet, Rfl: 0    lansoprazole (PREVACID) 30 mg capsule, Take 1 capsule (30 mg total) by mouth daily, Disp: 90 capsule, Rfl: 1    lansoprazole (PREVACID) 30 mg capsule, Take 30 mg by mouth daily, Disp: , Rfl:     losartan (COZAAR) 100 MG tablet, Take 1 tablet (100 mg total) by mouth daily, Disp: 90 tablet, Rfl: 0    metFORMIN (GLUCOPHAGE) 1000 MG tablet, Take 1 tablet (1,000 mg total) by mouth 2 (two) times a day with meals, Disp: 180 tablet, Rfl: 1    metroNIDAZOLE (FLAGYL) 500 mg tablet, Take 500 mg by mouth Three times a day, Disp: , Rfl:     Multiple Vitamins-Minerals (MULTIVITAMIN ADULT) TABS, Take 1 tablet by mouth daily, Disp: , Rfl:     Omega 3-6-9 Fatty Acids (OMEGA 3-6-9 COMPLEX PO), Take 1 capsule by mouth daily, Disp: , Rfl:     Probiotic Product (PROBIOTIC-10 PO), Take 10 capsules by mouth, Disp: , Rfl:     sitaGLIPtin (JANUVIA) 100 mg tablet, Take 1 tablet (100 mg total) by mouth daily, Disp: 90 tablet, Rfl: 1    No Known Allergies    Social History   Past Surgical History:   Procedure Laterality Date    TONSILLECTOMY       Family History   Problem Relation Age of Onset    Leukemia Father     Diabetes Family     Hypertension Family        Objective:  /82 (BP Location: Left arm, Patient Position: Sitting, Cuff Size: Large)   Pulse (!) 111   Temp (!) 97 2 °F (36 2 °C) (Temporal)   Ht 5' 10" (1 778 m)   Wt 99 1 kg (218 lb 6 4 oz)   SpO2 97%   BMI 31 34 kg/m²     Recent Results (from the past 1344 hour(s))   HEMOGLOBIN A1C    Collection Time: 07/18/20  4:19 AM   Result Value Ref Range    Hemoglobin A1C 10 9 (H) <5 7 %    eAG, EST AVG Glucose 266 (H) <154 mg/dL            Physical Exam   Constitutional: He is oriented to person, place, and time  He appears well-developed and well-nourished  No distress  HENT:   Head: Normocephalic and atraumatic  Eyes: Pupils are equal, round, and reactive to light  Conjunctivae and EOM are normal  Right eye exhibits no discharge  Left eye exhibits no discharge  No scleral icterus  Neck: Normal range of motion  Neck supple  No JVD present  No thyromegaly present  Cardiovascular: Normal rate, regular rhythm, normal heart sounds and intact distal pulses  Exam reveals no gallop and no friction rub  No murmur heard  Pulmonary/Chest: Effort normal and breath sounds normal  No respiratory distress  He has no wheezes  He has no rales  He exhibits no tenderness  Abdominal: Soft  Bowel sounds are normal  He exhibits no distension and no mass  There is no tenderness  There is no rebound and no guarding  Genitourinary:         Musculoskeletal: Normal range of motion  He exhibits no edema, tenderness or deformity  Lymphadenopathy:     He has no cervical adenopathy  Neurological: He is alert and oriented to person, place, and time  He has normal reflexes  No cranial nerve deficit  Coordination normal    Skin: Skin is warm and dry  No rash noted  He is not diaphoretic  No erythema  No pallor  Psychiatric: He has a normal mood and affect  His behavior is normal  Judgment and thought content normal    Nursing note and vitals reviewed

## 2020-09-01 DIAGNOSIS — E11.9 DIABETES MELLITUS TYPE 2, NONINSULIN DEPENDENT (HCC): ICD-10-CM

## 2020-09-01 RX ORDER — GLIMEPIRIDE 4 MG/1
TABLET ORAL
Qty: 180 TABLET | Refills: 0 | OUTPATIENT
Start: 2020-09-01

## 2020-10-12 ENCOUNTER — TELEPHONE (OUTPATIENT)
Dept: INTERNAL MEDICINE CLINIC | Age: 50
End: 2020-10-12

## 2020-10-23 ENCOUNTER — TELEMEDICINE (OUTPATIENT)
Dept: INTERNAL MEDICINE CLINIC | Age: 50
End: 2020-10-23
Payer: COMMERCIAL

## 2020-10-23 VITALS — TEMPERATURE: 98.9 F

## 2020-10-23 DIAGNOSIS — R19.7 DIARRHEA OF PRESUMED INFECTIOUS ORIGIN: Primary | ICD-10-CM

## 2020-10-23 PROCEDURE — 99213 OFFICE O/P EST LOW 20 MIN: CPT | Performed by: INTERNAL MEDICINE

## 2020-11-17 ENCOUNTER — VBI (OUTPATIENT)
Dept: ADMINISTRATIVE | Facility: OTHER | Age: 50
End: 2020-11-17

## 2020-12-14 ENCOUNTER — OFFICE VISIT (OUTPATIENT)
Dept: URGENT CARE | Age: 50
End: 2020-12-14
Payer: COMMERCIAL

## 2020-12-14 VITALS
RESPIRATION RATE: 16 BRPM | HEIGHT: 70 IN | WEIGHT: 222 LBS | OXYGEN SATURATION: 98 % | BODY MASS INDEX: 31.78 KG/M2 | TEMPERATURE: 97.2 F | HEART RATE: 87 BPM

## 2020-12-14 DIAGNOSIS — B34.9 VIRAL ILLNESS: Primary | ICD-10-CM

## 2020-12-14 PROCEDURE — 99213 OFFICE O/P EST LOW 20 MIN: CPT | Performed by: PHYSICIAN ASSISTANT

## 2020-12-14 PROCEDURE — U0003 INFECTIOUS AGENT DETECTION BY NUCLEIC ACID (DNA OR RNA); SEVERE ACUTE RESPIRATORY SYNDROME CORONAVIRUS 2 (SARS-COV-2) (CORONAVIRUS DISEASE [COVID-19]), AMPLIFIED PROBE TECHNIQUE, MAKING USE OF HIGH THROUGHPUT TECHNOLOGIES AS DESCRIBED BY CMS-2020-01-R: HCPCS | Performed by: PHYSICIAN ASSISTANT

## 2020-12-15 LAB — SARS-COV-2 RNA SPEC QL NAA+PROBE: NOT DETECTED

## 2021-02-01 DIAGNOSIS — E11.9 TYPE 2 DIABETES MELLITUS WITHOUT COMPLICATION, WITHOUT LONG-TERM CURRENT USE OF INSULIN (HCC): ICD-10-CM

## 2021-02-03 RX ORDER — SITAGLIPTIN 100 MG/1
TABLET, FILM COATED ORAL
Qty: 90 TABLET | Refills: 1 | Status: SHIPPED | OUTPATIENT
Start: 2021-02-03 | End: 2021-03-03 | Stop reason: SDUPTHER

## 2021-02-12 ENCOUNTER — TELEPHONE (OUTPATIENT)
Dept: INTERNAL MEDICINE CLINIC | Age: 51
End: 2021-02-12

## 2021-02-12 NOTE — TELEPHONE ENCOUNTER
Pt returned your phone call  I did schedule him on 3/3/2021 because his mother is coming in that day to see Dr Keira Robertson

## 2021-03-03 ENCOUNTER — OFFICE VISIT (OUTPATIENT)
Dept: INTERNAL MEDICINE CLINIC | Facility: CLINIC | Age: 51
End: 2021-03-03
Payer: COMMERCIAL

## 2021-03-03 ENCOUNTER — APPOINTMENT (OUTPATIENT)
Dept: LAB | Facility: IMAGING CENTER | Age: 51
End: 2021-03-03
Payer: COMMERCIAL

## 2021-03-03 VITALS
BODY MASS INDEX: 31.9 KG/M2 | TEMPERATURE: 97.7 F | SYSTOLIC BLOOD PRESSURE: 150 MMHG | HEIGHT: 69 IN | DIASTOLIC BLOOD PRESSURE: 100 MMHG | WEIGHT: 215.4 LBS | HEART RATE: 90 BPM | OXYGEN SATURATION: 96 %

## 2021-03-03 DIAGNOSIS — E11.9 TYPE 2 DIABETES MELLITUS WITHOUT COMPLICATION, WITHOUT LONG-TERM CURRENT USE OF INSULIN (HCC): Primary | ICD-10-CM

## 2021-03-03 DIAGNOSIS — K21.9 GASTROESOPHAGEAL REFLUX DISEASE WITHOUT ESOPHAGITIS: ICD-10-CM

## 2021-03-03 DIAGNOSIS — Z12.11 SCREENING FOR MALIGNANT NEOPLASM OF COLON: ICD-10-CM

## 2021-03-03 DIAGNOSIS — E11.9 DIABETES MELLITUS TYPE 2, NONINSULIN DEPENDENT (HCC): ICD-10-CM

## 2021-03-03 DIAGNOSIS — Z12.5 PROSTATE CANCER SCREENING: ICD-10-CM

## 2021-03-03 DIAGNOSIS — E78.1 HYPERTRIGLYCERIDEMIA: ICD-10-CM

## 2021-03-03 DIAGNOSIS — I10 BENIGN ESSENTIAL HTN: ICD-10-CM

## 2021-03-03 DIAGNOSIS — F17.210 CIGARETTE NICOTINE DEPENDENCE WITHOUT COMPLICATION: ICD-10-CM

## 2021-03-03 DIAGNOSIS — G43.009 MIGRAINE WITHOUT AURA AND WITHOUT STATUS MIGRAINOSUS, NOT INTRACTABLE: ICD-10-CM

## 2021-03-03 LAB
ALBUMIN SERPL BCP-MCNC: 4.2 G/DL (ref 3.5–5)
ALP SERPL-CCNC: 58 U/L (ref 46–116)
ALT SERPL W P-5'-P-CCNC: 46 U/L (ref 12–78)
ANION GAP SERPL CALCULATED.3IONS-SCNC: 5 MMOL/L (ref 4–13)
AST SERPL W P-5'-P-CCNC: 24 U/L (ref 5–45)
BASOPHILS # BLD AUTO: 0.06 THOUSANDS/ΜL (ref 0–0.1)
BASOPHILS NFR BLD AUTO: 0 % (ref 0–1)
BILIRUB SERPL-MCNC: 0.41 MG/DL (ref 0.2–1)
BUN SERPL-MCNC: 17 MG/DL (ref 5–25)
CALCIUM SERPL-MCNC: 10.5 MG/DL (ref 8.3–10.1)
CHLORIDE SERPL-SCNC: 106 MMOL/L (ref 100–108)
CHOLEST SERPL-MCNC: 165 MG/DL (ref 50–200)
CO2 SERPL-SCNC: 30 MMOL/L (ref 21–32)
CREAT SERPL-MCNC: 0.93 MG/DL (ref 0.6–1.3)
CREAT UR-MCNC: 115 MG/DL
EOSINOPHIL # BLD AUTO: 0.14 THOUSAND/ΜL (ref 0–0.61)
EOSINOPHIL NFR BLD AUTO: 1 % (ref 0–6)
ERYTHROCYTE [DISTWIDTH] IN BLOOD BY AUTOMATED COUNT: 12.8 % (ref 11.6–15.1)
EST. AVERAGE GLUCOSE BLD GHB EST-MCNC: 255 MG/DL
GFR SERPL CREATININE-BSD FRML MDRD: 95 ML/MIN/1.73SQ M
GLUCOSE P FAST SERPL-MCNC: 233 MG/DL (ref 65–99)
HBA1C MFR BLD: 10.5 %
HCT VFR BLD AUTO: 50.9 % (ref 36.5–49.3)
HDLC SERPL-MCNC: 33 MG/DL
HGB BLD-MCNC: 16.2 G/DL (ref 12–17)
IMM GRANULOCYTES # BLD AUTO: 0.1 THOUSAND/UL (ref 0–0.2)
IMM GRANULOCYTES NFR BLD AUTO: 1 % (ref 0–2)
LDLC SERPL CALC-MCNC: 83 MG/DL (ref 0–100)
LYMPHOCYTES # BLD AUTO: 2.03 THOUSANDS/ΜL (ref 0.6–4.47)
LYMPHOCYTES NFR BLD AUTO: 12 % (ref 14–44)
MCH RBC QN AUTO: 30.3 PG (ref 26.8–34.3)
MCHC RBC AUTO-ENTMCNC: 31.8 G/DL (ref 31.4–37.4)
MCV RBC AUTO: 95 FL (ref 82–98)
MICROALBUMIN UR-MCNC: 91.5 MG/L (ref 0–20)
MICROALBUMIN/CREAT 24H UR: 80 MG/G CREATININE (ref 0–30)
MONOCYTES # BLD AUTO: 0.87 THOUSAND/ΜL (ref 0.17–1.22)
MONOCYTES NFR BLD AUTO: 5 % (ref 4–12)
NEUTROPHILS # BLD AUTO: 14.18 THOUSANDS/ΜL (ref 1.85–7.62)
NEUTS SEG NFR BLD AUTO: 81 % (ref 43–75)
NONHDLC SERPL-MCNC: 132 MG/DL
NRBC BLD AUTO-RTO: 0 /100 WBCS
PLATELET # BLD AUTO: 299 THOUSANDS/UL (ref 149–390)
PMV BLD AUTO: 9.4 FL (ref 8.9–12.7)
POTASSIUM SERPL-SCNC: 4.4 MMOL/L (ref 3.5–5.3)
PROT SERPL-MCNC: 7.6 G/DL (ref 6.4–8.2)
PSA SERPL-MCNC: 0.3 NG/ML (ref 0–4)
RBC # BLD AUTO: 5.34 MILLION/UL (ref 3.88–5.62)
SODIUM SERPL-SCNC: 141 MMOL/L (ref 136–145)
TRIGL SERPL-MCNC: 247 MG/DL
WBC # BLD AUTO: 17.38 THOUSAND/UL (ref 4.31–10.16)

## 2021-03-03 PROCEDURE — 4004F PT TOBACCO SCREEN RCVD TLK: CPT | Performed by: INTERNAL MEDICINE

## 2021-03-03 PROCEDURE — 83036 HEMOGLOBIN GLYCOSYLATED A1C: CPT | Performed by: INTERNAL MEDICINE

## 2021-03-03 PROCEDURE — 85025 COMPLETE CBC W/AUTO DIFF WBC: CPT | Performed by: INTERNAL MEDICINE

## 2021-03-03 PROCEDURE — 36415 COLL VENOUS BLD VENIPUNCTURE: CPT | Performed by: INTERNAL MEDICINE

## 2021-03-03 PROCEDURE — 82043 UR ALBUMIN QUANTITATIVE: CPT | Performed by: INTERNAL MEDICINE

## 2021-03-03 PROCEDURE — 3008F BODY MASS INDEX DOCD: CPT | Performed by: INTERNAL MEDICINE

## 2021-03-03 PROCEDURE — 3725F SCREEN DEPRESSION PERFORMED: CPT | Performed by: INTERNAL MEDICINE

## 2021-03-03 PROCEDURE — 80053 COMPREHEN METABOLIC PANEL: CPT | Performed by: INTERNAL MEDICINE

## 2021-03-03 PROCEDURE — 3046F HEMOGLOBIN A1C LEVEL >9.0%: CPT | Performed by: INTERNAL MEDICINE

## 2021-03-03 PROCEDURE — 80061 LIPID PANEL: CPT | Performed by: INTERNAL MEDICINE

## 2021-03-03 PROCEDURE — G0103 PSA SCREENING: HCPCS | Performed by: INTERNAL MEDICINE

## 2021-03-03 PROCEDURE — 82570 ASSAY OF URINE CREATININE: CPT | Performed by: INTERNAL MEDICINE

## 2021-03-03 PROCEDURE — 3060F POS MICROALBUMINURIA REV: CPT | Performed by: INTERNAL MEDICINE

## 2021-03-03 PROCEDURE — 99214 OFFICE O/P EST MOD 30 MIN: CPT | Performed by: INTERNAL MEDICINE

## 2021-03-03 RX ORDER — GLIMEPIRIDE 4 MG/1
4 TABLET ORAL 2 TIMES DAILY
Qty: 180 TABLET | Refills: 1 | Status: SHIPPED | OUTPATIENT
Start: 2021-03-03

## 2021-03-03 RX ORDER — LANSOPRAZOLE 30 MG/1
30 CAPSULE, DELAYED RELEASE ORAL DAILY
Qty: 90 CAPSULE | Refills: 1 | Status: SHIPPED | OUTPATIENT
Start: 2021-03-03 | End: 2021-11-10 | Stop reason: SDUPTHER

## 2021-03-03 NOTE — LETTER
March 3, 2021     Patient: Mar Landeros   YOB: 1970   Date of Visit: 3/3/2021       To Whom it May Concern:    Mable Bradley is under my professional care  He was seen in my office on 3/3/2021  He may return to work on 3/4/2021  If you have any questions or concerns, please don't hesitate to call           Sincerely,          Rene Ashraf MD

## 2021-03-03 NOTE — ASSESSMENT & PLAN NOTE
Discussed lifestyle modification with diet, exercise, and weight loss  Continue fish oil supplementation  We will recheck lipid panel

## 2021-03-03 NOTE — PROGRESS NOTES
Assessment/Plan:    GERD (gastroesophageal reflux disease)  Controlled  Continue Prevacid  Type 2 diabetes mellitus (HCC)    Lab Results   Component Value Date    HGBA1C 10 9 (H) 07/18/2020     Continue glimepiride 4 mg twice a day, Januvia 100 mg daily, and metformin 1000 mg twice a day  Benign essential HTN  Elevated today however overall controlled  For now, continue losartan 100 mg daily  Hypertriglyceridemia  Discussed lifestyle modification with diet, exercise, and weight loss  Continue fish oil supplementation  We will recheck lipid panel  Cigarette nicotine dependence without complication  Counseled patient on smoking cessation  Diagnoses and all orders for this visit:    Type 2 diabetes mellitus without complication, without long-term current use of insulin ([facility] [address] )  -     Ambulatory referral to Ophthalmology; Future  -     sitaGLIPtin (Januvia) 100 mg tablet; Take 1 tablet (100 mg total) by mouth daily  -     CBC and differential  -     Comprehensive metabolic panel  -     Hemoglobin A1C  -     Lipid panel  -     Microalbumin / creatinine urine ratio    Screening for malignant neoplasm of colon  -     Ambulatory referral to Gastroenterology; Future    Gastroesophageal reflux disease without esophagitis  -     lansoprazole (PREVACID) 30 mg capsule; Take 1 capsule (30 mg total) by mouth daily    Benign essential HTN  -     CBC and differential  -     Comprehensive metabolic panel  -     Hemoglobin A1C  -     Lipid panel  -     Microalbumin / creatinine urine ratio    Migraine without aura and without status migrainosus, not intractable    Cigarette nicotine dependence without complication    Diabetes mellitus type 2, noninsulin dependent (HCC)  -     glimepiride (AMARYL) 4 mg tablet;  Take 1 tablet (4 mg total) by mouth 2 (two) times a day  -     CBC and differential  -     Comprehensive metabolic panel  -     Hemoglobin A1C  -     Lipid panel  -     Microalbumin / creatinine urine ratio    Prostate cancer screening  -     PSA, Total Screen    Hypertriglyceridemia    BMI 32 0-32 9,adult          BMI Counseling: Body mass index is 32 09 kg/m²  The BMI is above normal  Nutrition recommendations include decreasing portion sizes, encouraging healthy choices of fruits and vegetables, decreasing fast food intake, consuming healthier snacks, limiting drinks that contain sugar, moderation in carbohydrate intake, increasing intake of lean protein, reducing intake of saturated and trans fat and reducing intake of cholesterol  Exercise recommendations include moderate physical activity 150 minutes/week and exercising 3-5 times per week  No pharmacotherapy was ordered  Patient referred to PCP due to patient being overweight  Depression Screening and Follow-up Plan: Patient's depression screening was positive with a PHQ-2 score of 0  Clincally patient does not have depression  No treatment is required  Patient advised to follow-up with PCP for further management  Tobacco Cessation Counseling: Tobacco cessation counseling was provided  The patient is sincerely urged to quit consumption of tobacco  He is not ready to quit tobacco  Medication options and side effects of medication discussed  Patient refused medication  Time spent during encounter: 25 minutes (counseling, reviewing medications, and discussing treatment and plan)    Subjective:      Patient ID: Angela Ocasio is a 45 y o  male  Chief Complaint   Patient presents with    Follow-up     Patient is here for f/u DM, HNT and GERD  Pt does does not have any new medical concerns  Pt wants Dr Veva Sandifer as PCP        14-year-old male is seen today for follow-up of chronic conditions  No laboratory studies to review today  He is compliant with medication regimen  He has no active complaints or concerns at this time    FMLA form to be completed today for providing care to his mother and transportation is to her doctor appointments  Blood pressure:  Elevated today however he states that it often gets elevated whenever he has to take his mother to her appointments  In general, blood pressure is controlled  Hyperlipidemia  This is a chronic problem  The current episode started more than 1 year ago  The problem is controlled  Recent lipid tests were reviewed and are normal  Pertinent negatives include no chest pain or shortness of breath  Current antihyperlipidemic treatment includes diet change  There are no compliance problems  Hypertension  This is a chronic problem  The current episode started more than 1 year ago  The problem is unchanged  The problem is controlled (Elevated today due to stress from bring his mother to the doctor's appointment  )  Pertinent negatives include no chest pain, headaches, palpitations or shortness of breath  Past treatments include angiotensin blockers  The current treatment provides moderate improvement  There are no compliance problems  Diabetes  He presents for his follow-up diabetic visit  He has type 2 diabetes mellitus  His disease course has been stable  There are no hypoglycemic associated symptoms  Pertinent negatives for hypoglycemia include no dizziness or headaches  There are no diabetic associated symptoms  Pertinent negatives for diabetes include no chest pain, no fatigue and no weakness  There are no hypoglycemic complications  Symptoms are stable  Current diabetic treatment includes oral agent (triple therapy)  He is compliant with treatment all of the time  An ACE inhibitor/angiotensin II receptor blocker is being taken  Heartburn  He reports no abdominal pain, no chest pain, no choking, no coughing, no nausea, no sore throat or no wheezing  This is a chronic problem  The current episode started more than 1 year ago  The problem occurs rarely  Pertinent negatives include no fatigue  He has tried a PPI for the symptoms  The treatment provided moderate relief         The following portions of the patient's history were reviewed and updated as appropriate: allergies, current medications, past family history, past medical history, past social history, past surgical history and problem list     Review of Systems   Constitutional: Negative for activity change, appetite change, chills, diaphoresis, fatigue and fever  HENT: Negative for congestion, postnasal drip, rhinorrhea, sinus pressure, sinus pain, sneezing and sore throat  Eyes: Negative for visual disturbance  Respiratory: Negative for apnea, cough, choking, chest tightness, shortness of breath and wheezing  Cardiovascular: Negative for chest pain, palpitations and leg swelling  Gastrointestinal: Negative for abdominal distention, abdominal pain, anal bleeding, blood in stool, constipation, diarrhea, nausea and vomiting  Endocrine: Negative for cold intolerance and heat intolerance  Genitourinary: Negative for difficulty urinating, dysuria and hematuria  Musculoskeletal: Negative  Skin: Negative  Neurological: Negative for dizziness, weakness, light-headedness, numbness and headaches  Hematological: Negative for adenopathy  Psychiatric/Behavioral: Negative for agitation, sleep disturbance and suicidal ideas  All other systems reviewed and are negative          Past Medical History:   Diagnosis Date    Cellulitis of elbow     last assessed - [id number]    Diabetes mellitus ([facility] [address] )     Diverticulitis     Fatigue     last assessed - [id number]    GERD (gastroesophageal reflux disease)     History of gastroesophageal reflux (GERD)     Resolved - [id number]    Hypertension     Iron overload     last assessed - [id number]    Low HDL (under 40)     last assessed - [id number]    Tinea pedis of both feet     last assessed - [id number]         Current Outpatient Medications:     glimepiride (AMARYL) 4 mg tablet, Take 1 tablet (4 mg total) by mouth 2 (two) times a day, Disp: 180 tablet, Rfl: 1    lansoprazole (PREVACID) 30 mg capsule, Take 1 capsule (30 mg total) by mouth daily, Disp: 90 capsule, Rfl: 1    losartan (COZAAR) 100 MG tablet, Take 1 tablet (100 mg total) by mouth daily, Disp: 90 tablet, Rfl: 0    metFORMIN (GLUCOPHAGE) 1000 MG tablet, Take 1 tablet (1,000 mg total) by mouth 2 (two) times a day with meals, Disp: 180 tablet, Rfl: 1    Multiple Vitamins-Minerals (MULTIVITAMIN ADULT) TABS, Take 1 tablet by mouth daily, Disp: , Rfl:     sitaGLIPtin (Januvia) 100 mg tablet, Take 1 tablet (100 mg total) by mouth daily, Disp: 90 tablet, Rfl: 1    Cholecalciferol 25 MCG (1000 UT) tablet, Take 4,000 Units by mouth daily, Disp: , Rfl:     Omega 3-6-9 Fatty Acids (OMEGA 3-6-9 COMPLEX PO), Take 1 capsule by mouth daily, Disp: , Rfl:     Probiotic Product (PROBIOTIC-10 PO), Take 10 capsules by mouth, Disp: , Rfl:     No Known Allergies    Social History   Past Surgical History:   Procedure Laterality Date    TONSILLECTOMY       Family History   Problem Relation Age of Onset    Leukemia Father     Diabetes Family     Hypertension Family     Diabetes Mother        Objective:  /100 (BP Location: Left arm, Patient Position: Sitting, Cuff Size: Large)   Pulse 90   Temp 97 7 °F (36 5 °C) (Temporal)   Ht 5' 8 7" (1 745 m)   Wt 97 7 kg (215 lb 6 4 oz)   SpO2 96%   BMI 32 09 kg/m²     No results found for this or any previous visit (from the past 1344 hour(s))  Physical Exam  Vitals signs and nursing note reviewed  Constitutional:       General: He is not in acute distress  Appearance: He is well-developed  He is not diaphoretic  HENT:      Head: Normocephalic and atraumatic  Eyes:      General: No scleral icterus  Right eye: No discharge  Left eye: No discharge  Conjunctiva/sclera: Conjunctivae normal       Pupils: Pupils are equal, round, and reactive to light  Neck:      Musculoskeletal: Normal range of motion and neck supple  Thyroid: No thyromegaly  Vascular: No JVD  Cardiovascular:      Rate and Rhythm: Normal rate and regular rhythm  Heart sounds: Normal heart sounds  No murmur  No friction rub  No gallop  Pulmonary:      Effort: Pulmonary effort is normal  No respiratory distress  Breath sounds: Normal breath sounds  No wheezing or rales  Chest:      Chest wall: No tenderness  Abdominal:      General: Bowel sounds are normal  There is no distension  Palpations: Abdomen is soft  There is no mass  Tenderness: There is no abdominal tenderness  There is no guarding or rebound  Musculoskeletal: Normal range of motion  General: No tenderness or deformity  Lymphadenopathy:      Cervical: No cervical adenopathy  Skin:     General: Skin is warm and dry  Coloration: Skin is not pale  Findings: No erythema or rash  Neurological:      Mental Status: He is alert and oriented to person, place, and time  Cranial Nerves: No cranial nerve deficit  Coordination: Coordination normal       Deep Tendon Reflexes: Reflexes are normal and symmetric  Psychiatric:         Behavior: Behavior normal          Thought Content:  Thought content normal          Judgment: Judgment normal

## 2021-03-03 NOTE — ASSESSMENT & PLAN NOTE
Lab Results   Component Value Date    HGBA1C 10 9 (H) 07/18/2020     Continue glimepiride 4 mg twice a day, Januvia 100 mg daily, and metformin 1000 mg twice a day

## 2021-03-08 ENCOUNTER — TELEPHONE (OUTPATIENT)
Dept: INTERNAL MEDICINE CLINIC | Facility: CLINIC | Age: 51
End: 2021-03-08

## 2021-03-08 NOTE — TELEPHONE ENCOUNTER
Patient called back in regards to going over his BW results  He will not be available until after 3 today, but he said you could leave him a VM

## 2021-03-11 NOTE — TELEPHONE ENCOUNTER
Patient returning your call  He stated that is hard to get a hold of him during the day while he's at work  He said that if you wanted to you could leave a detailed message on his voicemail

## 2021-03-18 ENCOUNTER — TELEPHONE (OUTPATIENT)
Dept: INTERNAL MEDICINE CLINIC | Facility: CLINIC | Age: 51
End: 2021-03-18

## 2021-04-12 ENCOUNTER — TRANSITIONAL CARE MANAGEMENT (OUTPATIENT)
Dept: INTERNAL MEDICINE CLINIC | Age: 51
End: 2021-04-12

## 2021-04-14 ENCOUNTER — OFFICE VISIT (OUTPATIENT)
Dept: INTERNAL MEDICINE CLINIC | Facility: CLINIC | Age: 51
End: 2021-04-14
Payer: COMMERCIAL

## 2021-04-14 VITALS
DIASTOLIC BLOOD PRESSURE: 90 MMHG | TEMPERATURE: 97.3 F | HEART RATE: 106 BPM | SYSTOLIC BLOOD PRESSURE: 146 MMHG | OXYGEN SATURATION: 97 % | HEIGHT: 69 IN | WEIGHT: 213.2 LBS | BODY MASS INDEX: 31.58 KG/M2

## 2021-04-14 DIAGNOSIS — L02.215 PERINEAL ABSCESS: ICD-10-CM

## 2021-04-14 DIAGNOSIS — E11.9 TYPE 2 DIABETES MELLITUS WITHOUT COMPLICATION, WITHOUT LONG-TERM CURRENT USE OF INSULIN (HCC): Primary | ICD-10-CM

## 2021-04-14 PROCEDURE — 99496 TRANSJ CARE MGMT HIGH F2F 7D: CPT | Performed by: INTERNAL MEDICINE

## 2021-04-14 RX ORDER — CEPHALEXIN 500 MG/1
500 CAPSULE ORAL 4 TIMES DAILY
COMMUNITY
Start: 2021-04-11 | End: 2021-04-19

## 2021-04-14 RX ORDER — METRONIDAZOLE 500 MG/1
500 TABLET ORAL 3 TIMES DAILY
COMMUNITY
Start: 2021-04-11 | End: 2021-04-19

## 2021-04-14 NOTE — ASSESSMENT & PLAN NOTE
Lab Results   Component Value Date    HGBA1C 10 9 (H) 04/09/2021   He would like to defer on starting insulin if at this time  He states that he will make lifestyle modifications  Will recheck A1c in 3 months  Continue metformin 1000 mg twice a day, Januvia 100 mg daily, and glimepiride 4 mg twice a day

## 2021-04-14 NOTE — ASSESSMENT & PLAN NOTE
Continue Keflex and metronidazole until completion  Discussed local wound care  He is to contact office if he has recurrence of symptoms  Will refer to General surgery for future recurrences

## 2021-04-15 DIAGNOSIS — Z23 ENCOUNTER FOR IMMUNIZATION: ICD-10-CM

## 2021-04-19 ENCOUNTER — OFFICE VISIT (OUTPATIENT)
Dept: INTERNAL MEDICINE CLINIC | Facility: CLINIC | Age: 51
End: 2021-04-19
Payer: COMMERCIAL

## 2021-04-19 VITALS
WEIGHT: 210.8 LBS | HEART RATE: 117 BPM | DIASTOLIC BLOOD PRESSURE: 104 MMHG | BODY MASS INDEX: 31.22 KG/M2 | TEMPERATURE: 97.8 F | SYSTOLIC BLOOD PRESSURE: 144 MMHG | OXYGEN SATURATION: 98 % | RESPIRATION RATE: 20 BRPM | HEIGHT: 69 IN

## 2021-04-19 DIAGNOSIS — L02.215 PERINEAL ABSCESS: Primary | ICD-10-CM

## 2021-04-19 PROCEDURE — 99213 OFFICE O/P EST LOW 20 MIN: CPT | Performed by: INTERNAL MEDICINE

## 2021-04-19 PROCEDURE — 3008F BODY MASS INDEX DOCD: CPT | Performed by: INTERNAL MEDICINE

## 2021-04-19 PROCEDURE — 4004F PT TOBACCO SCREEN RCVD TLK: CPT | Performed by: INTERNAL MEDICINE

## 2021-04-19 NOTE — ASSESSMENT & PLAN NOTE
Continue Keflex and Flagyl until completion  Discussed local wound care  Will extend his leave from work for another week  He is to contact office for worsening symptoms

## 2021-04-19 NOTE — PROGRESS NOTES
Assessment/Plan:    Perineal abscess  Continue Keflex and Flagyl until completion  Discussed local wound care  Will extend his leave from work for another week  He is to contact office for worsening symptoms  Diagnoses and all orders for this visit:    Perineal abscess                Time spent during encounter: 25 minutes (counseling, reviewing medications, and discussing treatment and plan)    Subjective:      Patient ID: Lupe Watson is a 45 y o  male  Chief Complaint   Patient presents with    Mass     still sore and hurts    Hypertension     bp and heart rate elevated       25-year-old male seen today for follow-up of perianal abscess status post incision and drainage  He has been compliant with Keflex and Flagyl  He denies any fever/chills or drainage from the incision site  The incision site is healing however he has mild tenderness  The following portions of the patient's history were reviewed and updated as appropriate: allergies, current medications, past family history, past medical history, past social history, past surgical history and problem list     Review of Systems   Constitutional: Negative for activity change, appetite change, chills, diaphoresis, fatigue and fever  HENT: Negative for congestion, postnasal drip, rhinorrhea, sinus pressure, sinus pain, sneezing and sore throat  Eyes: Negative for visual disturbance  Respiratory: Negative for apnea, cough, choking, chest tightness, shortness of breath and wheezing  Cardiovascular: Negative for chest pain, palpitations and leg swelling  Gastrointestinal: Negative for abdominal distention, abdominal pain, anal bleeding, blood in stool, constipation, diarrhea, nausea and vomiting  Endocrine: Negative for cold intolerance and heat intolerance  Genitourinary: Negative for difficulty urinating, dysuria and hematuria  Musculoskeletal: Negative  Skin: Positive for wound     Neurological: Negative for dizziness, weakness, light-headedness, numbness and headaches  Hematological: Negative for adenopathy  Psychiatric/Behavioral: Negative for agitation, sleep disturbance and suicidal ideas  All other systems reviewed and are negative          Past Medical History:   Diagnosis Date    Cellulitis of elbow     last assessed - [id number]    Diabetes mellitus ([facility] [address] )     Diverticulitis     Fatigue     last assessed - [id number]    GERD (gastroesophageal reflux disease)     History of gastroesophageal reflux (GERD)     Resolved - [id number]    Hypertension     Iron overload     last assessed - [id number]    Low HDL (under 40)     last assessed - [id number]    Tinea pedis of both feet     last assessed - [id number]         Current Outpatient Medications:     cephalexin (KEFLEX) 500 mg capsule, Take 500 mg by mouth 4 (four) times a day, Disp: , Rfl:     glimepiride (AMARYL) 4 mg tablet, Take 1 tablet (4 mg total) by mouth 2 (two) times a day, Disp: 180 tablet, Rfl: 1    lansoprazole (PREVACID) 30 mg capsule, Take 1 capsule (30 mg total) by mouth daily, Disp: 90 capsule, Rfl: 1    losartan (COZAAR) 100 MG tablet, Take 1 tablet (100 mg total) by mouth daily, Disp: 90 tablet, Rfl: 0    metFORMIN (GLUCOPHAGE) 1000 MG tablet, Take 1 tablet (1,000 mg total) by mouth 2 (two) times a day with meals, Disp: 180 tablet, Rfl: 1    metroNIDAZOLE (FLAGYL) 500 mg tablet, Take 500 mg by mouth Three times a day, Disp: , Rfl:     Multiple Vitamins-Minerals (MULTIVITAMIN ADULT) TABS, Take 1 tablet by mouth daily, Disp: , Rfl:     Omega 3-6-9 Fatty Acids (OMEGA 3-6-9 COMPLEX PO), Take 1 capsule by mouth daily, Disp: , Rfl:     Probiotic Product (PROBIOTIC-10 PO), Take 10 capsules by mouth, Disp: , Rfl:     sitaGLIPtin (Januvia) 100 mg tablet, Take 1 tablet (100 mg total) by mouth daily, Disp: 90 tablet, Rfl: 1    Cholecalciferol 25 MCG (1000 UT) tablet, Take 4,000 Units by mouth daily, Disp: , Rfl:     No Known Allergies    Social History   Past Surgical History:   Procedure Laterality Date    TONSILLECTOMY       Family History   Problem Relation Age of Onset    Leukemia Father     Diabetes Family     Hypertension Family     Diabetes Mother        Objective:  BP (!) 144/104 (BP Location: Left arm, Patient Position: Sitting, Cuff Size: Standard)   Pulse (!) 117   Temp 97 8 °F (36 6 °C) (Tympanic)   Resp 20   Ht 5' 8 5" (1 74 m)   Wt 95 6 kg (210 lb 12 8 oz)   SpO2 98%   BMI 31 59 kg/m²     Recent Results (from the past 1344 hour(s))   CBC and differential    Collection Time: 03/03/21  9:29 AM   Result Value Ref Range    WBC 17 38 (H) 4 31 - 10 16 Thousand/uL    RBC 5 34 3 88 - 5 62 Million/uL    Hemoglobin 16 2 12 0 - 17 0 g/dL    Hematocrit 50 9 (H) 36 5 - 49 3 %    MCV 95 82 - 98 fL    MCH 30 3 26 8 - 34 3 pg    MCHC 31 8 31 4 - 37 4 g/dL    RDW 12 8 11 6 - 15 1 %    MPV 9 4 8 9 - 12 7 fL    Platelets 212 248 - 102 Thousands/uL    nRBC 0 /100 WBCs    Neutrophils Relative 81 (H) 43 - 75 %    Immat GRANS % 1 0 - 2 %    Lymphocytes Relative 12 (L) 14 - 44 %    Monocytes Relative 5 4 - 12 %    Eosinophils Relative 1 0 - 6 %    Basophils Relative 0 0 - 1 %    Neutrophils Absolute 14 18 (H) 1 85 - 7 62 Thousands/µL    Immature Grans Absolute 0 10 0 00 - 0 20 Thousand/uL    Lymphocytes Absolute 2 03 0 60 - 4 47 Thousands/µL    Monocytes Absolute 0 87 0 17 - 1 22 Thousand/µL    Eosinophils Absolute 0 14 0 00 - 0 61 Thousand/µL    Basophils Absolute 0 06 0 00 - 0 10 Thousands/µL   Comprehensive metabolic panel    Collection Time: 03/03/21  9:29 AM   Result Value Ref Range    Sodium 141 136 - 145 mmol/L    Potassium 4 4 3 5 - 5 3 mmol/L    Chloride 106 100 - 108 mmol/L    CO2 30 21 - 32 mmol/L    ANION GAP 5 4 - 13 mmol/L    BUN 17 5 - 25 mg/dL    Creatinine 0 93 0 60 - 1 30 mg/dL    Glucose, Fasting 233 (H) 65 - 99 mg/dL    Calcium 10 5 (H) 8 3 - 10 1 mg/dL    AST 24 5 - 45 U/L    ALT 46 12 - 78 U/L    Alkaline Phosphatase 58 46 - 116 U/L    Total Protein 7 6 6 4 - 8 2 g/dL    Albumin 4 2 3 5 - 5 0 g/dL    Total Bilirubin 0 41 0 20 - 1 00 mg/dL    eGFR 95 ml/min/1 73sq m   Hemoglobin A1C    Collection Time: 03/03/21  9:29 AM   Result Value Ref Range    Hemoglobin A1C 10 5 (H) Normal 3 8-5 6%; PreDiabetic 5 7-6 4%; Diabetic >=6 5%; Glycemic control for adults with diabetes <7 0% %     mg/dl   Lipid panel    Collection Time: 03/03/21  9:29 AM   Result Value Ref Range    Cholesterol 165 50 - 200 mg/dL    Triglycerides 247 (H) <=150 mg/dL    HDL, Direct 33 (L) >=40 mg/dL    LDL Calculated 83 0 - 100 mg/dL    Non-HDL-Chol (CHOL-HDL) 132 mg/dl   Microalbumin / creatinine urine ratio    Collection Time: 03/03/21  9:29 AM   Result Value Ref Range    Creatinine, Ur 115 0 mg/dL    Microalbum  ,U,Random 91 5 (H) 0 0 - 20 0 mg/L    Microalb Creat Ratio 80 (H) 0 - 30 mg/g creatinine   PSA, Total Screen    Collection Time: 03/03/21  9:29 AM   Result Value Ref Range    PSA 0 3 0 0 - 4 0 ng/mL   HEMOGLOBIN A1C    Collection Time: 04/09/21  5:34 PM   Result Value Ref Range    Hemoglobin A1C 10 9 (H) <5 7 %    eAG, EST AVG Glucose 266 (H) <154 mg/dL            Physical Exam  Vitals signs and nursing note reviewed  Constitutional:       General: He is not in acute distress  Appearance: He is well-developed  He is not diaphoretic  HENT:      Head: Normocephalic and atraumatic  Eyes:      General: No scleral icterus  Right eye: No discharge  Left eye: No discharge  Conjunctiva/sclera: Conjunctivae normal       Pupils: Pupils are equal, round, and reactive to light  Neck:      Musculoskeletal: Normal range of motion and neck supple  Thyroid: No thyromegaly  Vascular: No JVD  Cardiovascular:      Rate and Rhythm: Normal rate and regular rhythm  Heart sounds: Normal heart sounds  No murmur  No friction rub  No gallop      Pulmonary:      Effort: Pulmonary effort is normal  No respiratory distress  Breath sounds: Normal breath sounds  No wheezing or rales  Chest:      Chest wall: No tenderness  Abdominal:      General: Bowel sounds are normal  There is no distension  Palpations: Abdomen is soft  There is no mass  Tenderness: There is no abdominal tenderness  There is no guarding or rebound  Genitourinary:      Musculoskeletal: Normal range of motion  General: No tenderness or deformity  Lymphadenopathy:      Cervical: No cervical adenopathy  Skin:     General: Skin is warm and dry  Coloration: Skin is not pale  Findings: No erythema or rash  Neurological:      Mental Status: He is alert and oriented to person, place, and time  Cranial Nerves: No cranial nerve deficit  Coordination: Coordination normal       Deep Tendon Reflexes: Reflexes are normal and symmetric  Psychiatric:         Behavior: Behavior normal          Thought Content:  Thought content normal          Judgment: Judgment normal

## 2021-04-19 NOTE — LETTER
April 19, 2021     Patient: Edgar Ball   YOB: 1970   Date of Visit: 4/19/2021       To Whom it May Concern:    Gregor Spraguederic is under my professional care  He was seen in my office on 4/19/2021  He may return to work on 4/26/2021  If you have any questions or concerns, please don't hesitate to call           Sincerely,          Kathy Mendenhall MD

## 2021-04-19 NOTE — LETTER
April 19, 2021     Patient: Rene Malloy   YOB: 1970   Date of Visit: 4/19/2021       To Whom it May Concern:    Mariposa Bowles is under my professional care  He was seen in my office on 4/19/2021  He may return to work on 4/27/2021  If you have any questions or concerns, please don't hesitate to call           Sincerely,          Mike Suarez MD

## 2021-04-30 ENCOUNTER — TELEPHONE (OUTPATIENT)
Dept: INTERNAL MEDICINE CLINIC | Facility: CLINIC | Age: 51
End: 2021-04-30

## 2021-05-11 DIAGNOSIS — L02.215 PERINEAL ABSCESS: Primary | ICD-10-CM

## 2021-05-11 NOTE — PROGRESS NOTES
DennisCalled me this morning stating that he is having recurrence of the perianal abscess and has increased significantly in size when compared to yesterday  He has an appointment with me later this afternoon however he took off of work today due to the sudden increase in size  He denies any drainage, fevers or chills  He recently underwent incision and drainage of the perianal abscess at [facility] and received antibiotic therapy  Plan:  He will likely require in emergent incision and drainage of the abscess once again  Will refer to General surgery stat for evaluation and treatment  If General surgery is not able to see him today, I advised that he seek immediate medical attention at the emergency department

## 2021-05-13 ENCOUNTER — OFFICE VISIT (OUTPATIENT)
Dept: INTERNAL MEDICINE CLINIC | Facility: CLINIC | Age: 51
End: 2021-05-13
Payer: COMMERCIAL

## 2021-05-13 VITALS
DIASTOLIC BLOOD PRESSURE: 92 MMHG | OXYGEN SATURATION: 98 % | SYSTOLIC BLOOD PRESSURE: 164 MMHG | BODY MASS INDEX: 31.31 KG/M2 | WEIGHT: 211.4 LBS | TEMPERATURE: 98.7 F | HEIGHT: 69 IN | HEART RATE: 87 BPM | RESPIRATION RATE: 18 BRPM

## 2021-05-13 DIAGNOSIS — L02.215 PERINEAL ABSCESS: Primary | ICD-10-CM

## 2021-05-13 DIAGNOSIS — Z12.12 ENCOUNTER FOR COLORECTAL CANCER SCREENING: ICD-10-CM

## 2021-05-13 DIAGNOSIS — Z12.11 ENCOUNTER FOR COLORECTAL CANCER SCREENING: ICD-10-CM

## 2021-05-13 PROBLEM — R19.7 DIARRHEA OF PRESUMED INFECTIOUS ORIGIN: Status: RESOLVED | Noted: 2020-10-23 | Resolved: 2021-05-13

## 2021-05-13 PROCEDURE — 99213 OFFICE O/P EST LOW 20 MIN: CPT | Performed by: INTERNAL MEDICINE

## 2021-05-13 PROCEDURE — 4004F PT TOBACCO SCREEN RCVD TLK: CPT | Performed by: INTERNAL MEDICINE

## 2021-05-13 PROCEDURE — 3008F BODY MASS INDEX DOCD: CPT | Performed by: INTERNAL MEDICINE

## 2021-05-13 RX ORDER — CEPHALEXIN 500 MG/1
500 CAPSULE ORAL 4 TIMES DAILY
COMMUNITY
Start: 2021-05-11 | End: 2021-05-18

## 2021-05-13 RX ORDER — METRONIDAZOLE 500 MG/1
500 TABLET ORAL 3 TIMES DAILY
COMMUNITY
Start: 2021-05-11 | End: 2021-05-18

## 2021-05-13 NOTE — LETTER
May 13, 2021     Patient: Earl Vaughn   YOB: 1970   Date of Visit: 5/13/2021       To Whom it May Concern:    Natali Abad is under my professional care  He was seen in my office on 5/13/2021  He is being referred to specialists (general surgery and colorectal surgery) regarding a recurrent medical condition  Please allow him to take some time off of work to go to his appointments  If you have any questions or concerns, please don't hesitate to call           Sincerely,          Nereyda Pérez MD

## 2021-05-13 NOTE — ASSESSMENT & PLAN NOTE
Continue Keflex and metronidazole until completion  Advised that he establish care with General surgery and Colorectal surgery regarding evaluation and treatment  Consideration for colonoscopy to rule out possibility of fistula

## 2021-05-13 NOTE — PROGRESS NOTES
Assessment/Plan:    Perineal abscess  Continue Keflex and metronidazole until completion  Advised that he establish care with General surgery and Colorectal surgery regarding evaluation and treatment  Consideration for colonoscopy to rule out possibility of fistula  Diagnoses and all orders for this visit:    Perineal abscess  -     Ambulatory referral to Colorectal Surgery; Future    Encounter for colorectal cancer screening  -     Ambulatory referral to Colorectal Surgery; Future    Other orders  -     cephalexin (KEFLEX) 500 mg capsule; Take 500 mg by mouth 4 (four) times a day  -     metroNIDAZOLE (FLAGYL) 500 mg tablet; Take 500 mg by mouth Three times a day                  Subjective:      Patient ID: Dyllan Aguilera is a 45 y o  male  Chief Complaint   Patient presents with    Follow-up     [facility] ER  5/11/21 for perineal abcess  Lanced it while in the ER  tried calling surgent on tuesday but no answer    health maintenance     annual exam       69-year-old male is seen today for ER follow-up after he underwent incision drainage at [facility] [place]  This is the 3rd recurrence of perianal abscess to which he has needed incision drainage  The previous to required hospitalization  He underwent incision drainage in the emergency department and was discharged with Keflex and metronidazole  He was given a referral to General surgery yesterday however he was contacted by general surgery while on his way to the emergency department  He was advised to undergo colonoscopy to rule out possible fistula given recurrence of perianal abscess  He reports no further drainage this morning following incision and drainage and initiation of antibiotics  The perianal region is tender to palpation        The following portions of the patient's history were reviewed and updated as appropriate: allergies, current medications, past family history, past medical history, past social history, past surgical history and problem list     Review of Systems   Constitutional: Negative for activity change, appetite change, chills, diaphoresis, fatigue and fever  HENT: Negative for congestion, postnasal drip, rhinorrhea, sinus pressure, sinus pain, sneezing and sore throat  Eyes: Negative for visual disturbance  Respiratory: Negative for apnea, cough, choking, chest tightness, shortness of breath and wheezing  Cardiovascular: Negative for chest pain, palpitations and leg swelling  Gastrointestinal: Negative for abdominal distention, abdominal pain, anal bleeding, blood in stool, constipation, diarrhea, nausea and vomiting  Endocrine: Negative for cold intolerance and heat intolerance  Genitourinary: Negative for difficulty urinating, dysuria and hematuria  Musculoskeletal: Negative  Skin: Positive for wound  Neurological: Negative for dizziness, weakness, light-headedness, numbness and headaches  Hematological: Negative for adenopathy  Psychiatric/Behavioral: Negative for agitation, sleep disturbance and suicidal ideas  All other systems reviewed and are negative          Past Medical History:   Diagnosis Date    Cellulitis of elbow     last assessed - [id number]    Diabetes mellitus ([facility] [address] )     Diverticulitis     Fatigue     last assessed - [id number]    GERD (gastroesophageal reflux disease)     History of gastroesophageal reflux (GERD)     Resolved - [id number]    Hypertension     Iron overload     last assessed - [id number]    Low HDL (under 40)     last assessed - [id number]    Tinea pedis of both feet     last assessed - [id number]         Current Outpatient Medications:     cephalexin (KEFLEX) 500 mg capsule, Take 500 mg by mouth 4 (four) times a day, Disp: , Rfl:     Cholecalciferol 25 MCG (1000 UT) tablet, Take 4,000 Units by mouth daily, Disp: , Rfl:     glimepiride (AMARYL) 4 mg tablet, Take 1 tablet (4 mg total) by mouth 2 (two) times a day, Disp: 180 tablet, Rfl: 1    lansoprazole (PREVACID) 30 mg capsule, Take 1 capsule (30 mg total) by mouth daily, Disp: 90 capsule, Rfl: 1    losartan (COZAAR) 100 MG tablet, Take 1 tablet (100 mg total) by mouth daily, Disp: 90 tablet, Rfl: 0    metFORMIN (GLUCOPHAGE) 1000 MG tablet, Take 1 tablet (1,000 mg total) by mouth 2 (two) times a day with meals, Disp: 180 tablet, Rfl: 1    metroNIDAZOLE (FLAGYL) 500 mg tablet, Take 500 mg by mouth Three times a day, Disp: , Rfl:     Multiple Vitamins-Minerals (MULTIVITAMIN ADULT) TABS, Take 1 tablet by mouth daily, Disp: , Rfl:     Omega 3-6-9 Fatty Acids (OMEGA 3-6-9 COMPLEX PO), Take 1 capsule by mouth daily, Disp: , Rfl:     Probiotic Product (PROBIOTIC-10 PO), Take 10 capsules by mouth, Disp: , Rfl:     sitaGLIPtin (Januvia) 100 mg tablet, Take 1 tablet (100 mg total) by mouth daily, Disp: 90 tablet, Rfl: 1    No Known Allergies    Social History   Past Surgical History:   Procedure Laterality Date    TONSILLECTOMY       Family History   Problem Relation Age of Onset    Leukemia Father     Diabetes Family     Hypertension Family     Diabetes Mother        Objective:  /92 (BP Location: Right arm, Patient Position: Sitting, Cuff Size: Standard)   Pulse 87   Temp 98 7 °F (37 1 °C) (Temporal)   Resp 18   Ht 5' 8 5" (1 74 m)   Wt 95 9 kg (211 lb 6 4 oz)   SpO2 98%   BMI 31 68 kg/m²     Recent Results (from the past 1344 hour(s))   HEMOGLOBIN A1C    Collection Time: 04/09/21  5:34 PM   Result Value Ref Range    Hemoglobin A1C 10 9 (H) <5 7 %    eAG, EST AVG Glucose 266 (H) <154 mg/dL            Physical Exam  Vitals signs and nursing note reviewed  Constitutional:       General: He is not in acute distress  Appearance: He is well-developed  He is not diaphoretic  HENT:      Head: Normocephalic and atraumatic  Eyes:      General: No scleral icterus  Right eye: No discharge  Left eye: No discharge        Conjunctiva/sclera: Conjunctivae normal  Pupils: Pupils are equal, round, and reactive to light  Neck:      Musculoskeletal: Normal range of motion and neck supple  Thyroid: No thyromegaly  Vascular: No JVD  Cardiovascular:      Rate and Rhythm: Normal rate and regular rhythm  Heart sounds: Normal heart sounds  No murmur  No friction rub  No gallop  Pulmonary:      Effort: Pulmonary effort is normal  No respiratory distress  Breath sounds: Normal breath sounds  No wheezing or rales  Chest:      Chest wall: No tenderness  Abdominal:      General: Bowel sounds are normal  There is no distension  Palpations: Abdomen is soft  There is no mass  Tenderness: There is no abdominal tenderness  There is no guarding or rebound  Genitourinary:      Musculoskeletal: Normal range of motion  General: No tenderness or deformity  Lymphadenopathy:      Cervical: No cervical adenopathy  Skin:     General: Skin is warm and dry  Coloration: Skin is not pale  Findings: No erythema or rash  Neurological:      Mental Status: He is alert and oriented to person, place, and time  Cranial Nerves: No cranial nerve deficit  Coordination: Coordination normal       Deep Tendon Reflexes: Reflexes are normal and symmetric  Psychiatric:         Behavior: Behavior normal          Thought Content:  Thought content normal          Judgment: Judgment normal  None

## 2021-05-13 NOTE — LETTER
May 13, 2021     Patient: Aric Corrales   YOB: 1970   Date of Visit: 5/13/2021       To Whom it May Concern:    Lv Yip is under my professional care  He was seen in my office on 5/13/2021  He may return to work on 5/17/2021  If you have any questions or concerns, please don't hesitate to call           Sincerely,          Farooq Callejas MD

## 2021-05-25 ENCOUNTER — VBI (OUTPATIENT)
Dept: ADMINISTRATIVE | Facility: OTHER | Age: 51
End: 2021-05-25

## 2021-06-22 ENCOUNTER — TELEPHONE (OUTPATIENT)
Dept: INTERNAL MEDICINE CLINIC | Facility: CLINIC | Age: 51
End: 2021-06-22

## 2021-06-22 NOTE — TELEPHONE ENCOUNTER
LMOM for pt to call me at [facility] office    Pt is due for a diabetic eye exam   Please assist in scheduling    Pt should have scheduled per Dr Joni Dean

## 2021-07-07 ENCOUNTER — VBI (OUTPATIENT)
Dept: ADMINISTRATIVE | Facility: OTHER | Age: 51
End: 2021-07-07

## 2021-08-16 ENCOUNTER — OFFICE VISIT (OUTPATIENT)
Dept: INTERNAL MEDICINE CLINIC | Facility: CLINIC | Age: 51
End: 2021-08-16
Payer: COMMERCIAL

## 2021-08-16 VITALS
OXYGEN SATURATION: 98 % | TEMPERATURE: 98 F | SYSTOLIC BLOOD PRESSURE: 130 MMHG | WEIGHT: 214 LBS | HEIGHT: 69 IN | DIASTOLIC BLOOD PRESSURE: 80 MMHG | HEART RATE: 95 BPM | BODY MASS INDEX: 31.7 KG/M2

## 2021-08-16 DIAGNOSIS — Z11.59 NEED FOR HEPATITIS C SCREENING TEST: ICD-10-CM

## 2021-08-16 DIAGNOSIS — Z12.11 SCREEN FOR COLON CANCER: Primary | ICD-10-CM

## 2021-08-16 DIAGNOSIS — E78.1 HYPERTRIGLYCERIDEMIA: ICD-10-CM

## 2021-08-16 DIAGNOSIS — E11.9 TYPE 2 DIABETES MELLITUS WITHOUT COMPLICATION, WITHOUT LONG-TERM CURRENT USE OF INSULIN (HCC): ICD-10-CM

## 2021-08-16 DIAGNOSIS — R16.0 LARGE LIVER: ICD-10-CM

## 2021-08-16 DIAGNOSIS — F17.210 CIGARETTE NICOTINE DEPENDENCE WITHOUT COMPLICATION: ICD-10-CM

## 2021-08-16 DIAGNOSIS — I10 BENIGN ESSENTIAL HTN: ICD-10-CM

## 2021-08-16 DIAGNOSIS — K21.9 GASTROESOPHAGEAL REFLUX DISEASE WITHOUT ESOPHAGITIS: ICD-10-CM

## 2021-08-16 LAB — SL AMB POCT HEMOGLOBIN AIC: 10.2 (ref ?–6.5)

## 2021-08-16 PROCEDURE — 3079F DIAST BP 80-89 MM HG: CPT | Performed by: INTERNAL MEDICINE

## 2021-08-16 PROCEDURE — 3046F HEMOGLOBIN A1C LEVEL >9.0%: CPT | Performed by: INTERNAL MEDICINE

## 2021-08-16 PROCEDURE — 3075F SYST BP GE 130 - 139MM HG: CPT | Performed by: INTERNAL MEDICINE

## 2021-08-16 PROCEDURE — 99214 OFFICE O/P EST MOD 30 MIN: CPT | Performed by: INTERNAL MEDICINE

## 2021-08-16 PROCEDURE — 3008F BODY MASS INDEX DOCD: CPT | Performed by: INTERNAL MEDICINE

## 2021-08-16 PROCEDURE — 4004F PT TOBACCO SCREEN RCVD TLK: CPT | Performed by: INTERNAL MEDICINE

## 2021-08-16 PROCEDURE — 83036 HEMOGLOBIN GLYCOSYLATED A1C: CPT | Performed by: INTERNAL MEDICINE

## 2021-08-16 NOTE — PROGRESS NOTES
Diabetic Foot Exam    Patient's shoes and socks removed  Right Foot/Ankle   Right Foot Inspection  Skin Exam: skin normal, skin intact, dry skin, callus and callus no warmth, no erythema, no maceration, no abnormal color, no pre-ulcer and no ulcer                          Toe Exam: ROM and strength within normal limitsno swelling, no tenderness, erythema and  no right toe deformity  Sensory       Monofilament testing: intact  Vascular  Capillary refills: < 3 seconds  The right DP pulse is 2+  The right PT pulse is 2+  Left Foot/Ankle  Left Foot Inspection  Skin Exam: skin normal, skin intact, dry skin and callusno warmth, no erythema, no maceration, normal color, no pre-ulcer and no ulcer                         Toe Exam: ROM and strength within normal limitsno swelling, no tenderness, no erythema and no left toe deformity                   Sensory       Monofilament: intact  Vascular  Capillary refills: < 3 seconds  The left DP pulse is 2+  The left PT pulse is 2+  Assign Risk Category:  No deformity present;  No loss of protective sensation;        Risk: 0

## 2021-08-16 NOTE — PROGRESS NOTES
Assessment/Plan:    GERD (gastroesophageal reflux disease)  Controlled  Continue lansoprazole  Large liver  Trend LFTs  Type 2 diabetes mellitus without complication, without long-term current use of insulin (HCC)  Follow up A1c  Continue glimepiride 4 mg BID, Metformin 1000 mg BID, and Januvia 100 mg daily  Benign essential HTN  Controlled  Continue losartan 100 mg daily  Hypertriglyceridemia  Discussed lifestyle modifications with diet, exercise, and weight loss  Diagnoses and all orders for this visit:    Screen for colon cancer  -     Cologuard    Gastroesophageal reflux disease without esophagitis  -     Comprehensive metabolic panel; Future  -     CBC; Future    Type 2 diabetes mellitus without complication, without long-term current use of insulin (HCC)  -     Hemoglobin A1C; Future  -     Comprehensive metabolic panel; Future  -     CBC; Future  -     POCT hemoglobin A1c    Need for hepatitis C screening test  -     Hepatitis C antibody; Future    Large liver    Benign essential HTN    Cigarette nicotine dependence without complication    Hypertriglyceridemia    BMI 32 0-32 9,adult          BMI Counseling: Body mass index is 32 07 kg/m²  The BMI is above normal  Nutrition recommendations include decreasing portion sizes, encouraging healthy choices of fruits and vegetables, decreasing fast food intake, consuming healthier snacks, limiting drinks that contain sugar, moderation in carbohydrate intake, increasing intake of lean protein, reducing intake of saturated and trans fat and reducing intake of cholesterol  Exercise recommendations include moderate physical activity 150 minutes/week and exercising 3-5 times per week  No pharmacotherapy was ordered  Patient referred to PCP due to patient being overweight  Depression Screening and Follow-up Plan: Clincally patient does not have depression  No treatment is required   Patient advised to follow-up with PCP for further management  Tobacco Cessation Counseling: Tobacco cessation counseling was provided  The patient is sincerely urged to quit consumption of tobacco  He is not ready to quit tobacco  Medication options and side effects of medication discussed  Patient refused medication  Subjective:      Patient ID: Wilder Gracia is a 45 y o  male  Chief Complaint   Patient presents with    Follow-up     3 month follow up     Hypertension    Diabetes     foot exam done        45year old male is seen today for follow up of chronic conditions  No labs to review today however he will have lab studies completed soon  Hypertension  This is a chronic problem  The current episode started more than 1 year ago  The problem is unchanged  The problem is controlled  Pertinent negatives include no chest pain, headaches, palpitations or shortness of breath  Risk factors for coronary artery disease include diabetes mellitus, dyslipidemia and male gender  Past treatments include angiotensin blockers  The current treatment provides moderate improvement  There are no compliance problems  Diabetes  He presents for his follow-up diabetic visit  He has type 2 diabetes mellitus  His disease course has been stable  There are no hypoglycemic associated symptoms  Pertinent negatives for hypoglycemia include no dizziness or headaches  There are no diabetic associated symptoms  Pertinent negatives for diabetes include no chest pain, no fatigue and no weakness  There are no hypoglycemic complications  Symptoms are stable  There are no diabetic complications  Current diabetic treatment includes diet and oral agent (triple therapy)  He is compliant with treatment most of the time  He is following a generally healthy diet  He never participates in exercise  There is no change in his home blood glucose trend  An ACE inhibitor/angiotensin II receptor blocker is being taken  He does not see a podiatrist Eye exam is not current  Hyperlipidemia  This is a chronic problem  The current episode started more than 1 year ago  The problem is controlled  Recent lipid tests were reviewed and are normal  Pertinent negatives include no chest pain or shortness of breath  Current antihyperlipidemic treatment includes statins  The current treatment provides moderate improvement of lipids  There are no compliance problems  Heartburn  He reports no abdominal pain, no chest pain, no choking, no coughing, no nausea, no sore throat or no wheezing  This is a chronic problem  The current episode started more than 1 year ago  The problem occurs rarely  The problem has been unchanged  The symptoms are aggravated by certain foods  Pertinent negatives include no fatigue  He has tried a PPI for the symptoms  The treatment provided moderate relief  The following portions of the patient's history were reviewed and updated as appropriate: allergies, current medications, past family history, past medical history, past social history, past surgical history and problem list     Review of Systems   Constitutional: Negative for activity change, appetite change, chills, diaphoresis, fatigue and fever  HENT: Negative for congestion, postnasal drip, rhinorrhea, sinus pressure, sinus pain, sneezing and sore throat  Eyes: Negative for visual disturbance  Respiratory: Negative for apnea, cough, choking, chest tightness, shortness of breath and wheezing  Cardiovascular: Negative for chest pain, palpitations and leg swelling  Gastrointestinal: Negative for abdominal distention, abdominal pain, anal bleeding, blood in stool, constipation, diarrhea, nausea and vomiting  Endocrine: Negative for cold intolerance and heat intolerance  Genitourinary: Negative for difficulty urinating, dysuria and hematuria  Musculoskeletal: Negative  Skin: Negative  Neurological: Negative for dizziness, weakness, light-headedness, numbness and headaches  Hematological: Negative for adenopathy  Psychiatric/Behavioral: Negative for agitation, sleep disturbance and suicidal ideas  All other systems reviewed and are negative          Past Medical History:   Diagnosis Date    Cellulitis of elbow     last assessed - [id number]    Diabetes mellitus ([facility] [address] [facility]     Diverticulitis     Fatigue     last assessed - [id number]    GERD (gastroesophageal reflux disease)     History of gastroesophageal reflux (GERD)     Resolved - [id number]    Hypertension     Iron overload     last assessed - [id number]    Low HDL (under 40)     last assessed - [id number]    Tinea pedis of both feet     last assessed - [id number]         Current Outpatient Medications:     Cholecalciferol 25 MCG (1000 UT) tablet, Take 4,000 Units by mouth daily, Disp: , Rfl:     glimepiride (AMARYL) 4 mg tablet, Take 1 tablet (4 mg total) by mouth 2 (two) times a day, Disp: 180 tablet, Rfl: 1    lansoprazole (PREVACID) 30 mg capsule, Take 1 capsule (30 mg total) by mouth daily, Disp: 90 capsule, Rfl: 1    losartan (COZAAR) 100 MG tablet, Take 1 tablet (100 mg total) by mouth daily, Disp: 90 tablet, Rfl: 0    metFORMIN (GLUCOPHAGE) 1000 MG tablet, Take 1 tablet (1,000 mg total) by mouth 2 (two) times a day with meals, Disp: 180 tablet, Rfl: 1    Multiple Vitamins-Minerals (MULTIVITAMIN ADULT) TABS, Take 1 tablet by mouth daily, Disp: , Rfl:     Probiotic Product (PROBIOTIC-10 PO), Take 10 capsules by mouth, Disp: , Rfl:     sitaGLIPtin (Januvia) 100 mg tablet, Take 1 tablet (100 mg total) by mouth daily, Disp: 90 tablet, Rfl: 1    Omega 3-6-9 Fatty Acids (OMEGA 3-6-9 COMPLEX PO), Take 1 capsule by mouth daily (Patient not taking: Reported on 8/16/2021), Disp: , Rfl:     No Known Allergies    Social History   Past Surgical History:   Procedure Laterality Date    TONSILLECTOMY       Family History   Problem Relation Age of Onset    Leukemia Father     Diabetes Family     Hypertension Family     Diabetes Mother        Objective:  /80 (BP Location: Left arm, Patient Position: Sitting, Cuff Size: Adult)   Pulse 95   Temp 98 °F (36 7 °C) (Tympanic)   Ht 5' 8 5" (1 74 m)   Wt 97 1 kg (214 lb)   SpO2 98%   BMI 32 07 kg/m²     No results found for this or any previous visit (from the past 1344 hour(s))  Physical Exam  Vitals and nursing note reviewed  Constitutional:       General: He is not in acute distress  Appearance: He is well-developed  He is not diaphoretic  HENT:      Head: Normocephalic and atraumatic  Eyes:      General: No scleral icterus  Right eye: No discharge  Left eye: No discharge  Conjunctiva/sclera: Conjunctivae normal       Pupils: Pupils are equal, round, and reactive to light  Neck:      Thyroid: No thyromegaly  Vascular: No JVD  Cardiovascular:      Rate and Rhythm: Normal rate and regular rhythm  Heart sounds: Normal heart sounds  No murmur heard  No friction rub  No gallop  Pulmonary:      Effort: Pulmonary effort is normal  No respiratory distress  Breath sounds: Normal breath sounds  No wheezing or rales  Chest:      Chest wall: No tenderness  Abdominal:      General: Bowel sounds are normal  There is no distension  Palpations: Abdomen is soft  There is no mass  Tenderness: There is no abdominal tenderness  There is no guarding or rebound  Musculoskeletal:         General: No tenderness or deformity  Normal range of motion  Cervical back: Normal range of motion and neck supple  Lymphadenopathy:      Cervical: No cervical adenopathy  Skin:     General: Skin is warm and dry  Coloration: Skin is not pale  Findings: No erythema or rash  Neurological:      Mental Status: He is alert and oriented to person, place, and time  Cranial Nerves: No cranial nerve deficit  Coordination: Coordination normal       Deep Tendon Reflexes: Reflexes are normal and symmetric  Psychiatric:         Behavior: Behavior normal          Thought Content:  Thought content normal          Judgment: Judgment normal

## 2021-08-18 ENCOUNTER — VBI (OUTPATIENT)
Dept: ADMINISTRATIVE | Facility: OTHER | Age: 51
End: 2021-08-18

## 2021-08-24 ENCOUNTER — TELEPHONE (OUTPATIENT)
Dept: INTERNAL MEDICINE CLINIC | Facility: CLINIC | Age: 51
End: 2021-08-24

## 2021-08-24 NOTE — TELEPHONE ENCOUNTER
Contacted patient to inform him that his point of care A1c after last visit was elevated at 10 2%  He admits to noncompliance with glimepiride only taken 4 mg once a day, noncompliance with metformin only taking 1000 mg once a day, and was compliant with Januvia 100 mg daily  He remains reluctant to start insulin  He would like to adhere to a diabetic diet and  make an effort to be more compliant with metformin 1000 mg twice a day, glimepiride 4 mg twice a day, and Januvia 100 mg daily  Will recheck A1c in 3 months

## 2021-11-09 DIAGNOSIS — E11.9 DIABETES MELLITUS TYPE 2, NONINSULIN DEPENDENT (HCC): ICD-10-CM

## 2021-11-09 DIAGNOSIS — K21.9 GASTROESOPHAGEAL REFLUX DISEASE WITHOUT ESOPHAGITIS: ICD-10-CM

## 2021-11-09 DIAGNOSIS — I10 ESSENTIAL HYPERTENSION: ICD-10-CM

## 2021-11-10 DIAGNOSIS — K21.9 GASTROESOPHAGEAL REFLUX DISEASE WITHOUT ESOPHAGITIS: ICD-10-CM

## 2021-11-10 DIAGNOSIS — E11.9 DIABETES MELLITUS TYPE 2, NONINSULIN DEPENDENT (HCC): ICD-10-CM

## 2021-11-10 DIAGNOSIS — I10 ESSENTIAL HYPERTENSION: ICD-10-CM

## 2021-11-10 PROCEDURE — 4010F ACE/ARB THERAPY RXD/TAKEN: CPT | Performed by: INTERNAL MEDICINE

## 2021-11-10 RX ORDER — LANSOPRAZOLE 30 MG/1
30 CAPSULE, DELAYED RELEASE ORAL DAILY
Qty: 90 CAPSULE | Refills: 0 | Status: CANCELLED | OUTPATIENT
Start: 2021-11-10

## 2021-11-10 RX ORDER — LOSARTAN POTASSIUM 100 MG/1
100 TABLET ORAL DAILY
Qty: 90 TABLET | Refills: 0 | Status: CANCELLED | OUTPATIENT
Start: 2021-11-10

## 2021-11-10 RX ORDER — LOSARTAN POTASSIUM 100 MG/1
100 TABLET ORAL DAILY
Qty: 90 TABLET | Refills: 0 | Status: SHIPPED | OUTPATIENT
Start: 2021-11-10

## 2021-11-10 RX ORDER — LANSOPRAZOLE 30 MG/1
30 CAPSULE, DELAYED RELEASE ORAL DAILY
Qty: 90 CAPSULE | Refills: 1 | Status: SHIPPED | OUTPATIENT
Start: 2021-11-10 | End: 2021-11-15

## 2021-11-11 ENCOUNTER — TELEPHONE (OUTPATIENT)
Dept: INTERNAL MEDICINE CLINIC | Age: 51
End: 2021-11-11

## 2021-11-12 ENCOUNTER — TELEPHONE (OUTPATIENT)
Dept: INTERNAL MEDICINE CLINIC | Age: 51
End: 2021-11-12

## 2021-11-12 DIAGNOSIS — K21.9 GASTROESOPHAGEAL REFLUX DISEASE WITHOUT ESOPHAGITIS: Primary | ICD-10-CM

## 2021-11-15 RX ORDER — OMEPRAZOLE 20 MG/1
20 CAPSULE, DELAYED RELEASE ORAL DAILY
Qty: 90 CAPSULE | Refills: 1 | Status: SHIPPED | OUTPATIENT
Start: 2021-11-15 | End: 2022-03-24 | Stop reason: SDUPTHER

## 2021-11-22 ENCOUNTER — TELEMEDICINE (OUTPATIENT)
Dept: INTERNAL MEDICINE CLINIC | Facility: CLINIC | Age: 51
End: 2021-11-22
Payer: COMMERCIAL

## 2021-11-22 VITALS — WEIGHT: 214 LBS | HEIGHT: 69 IN | BODY MASS INDEX: 31.7 KG/M2

## 2021-11-22 DIAGNOSIS — B34.9 VIRAL INFECTION, UNSPECIFIED: Primary | ICD-10-CM

## 2021-11-22 PROCEDURE — 99213 OFFICE O/P EST LOW 20 MIN: CPT | Performed by: INTERNAL MEDICINE

## 2021-11-22 PROCEDURE — 3008F BODY MASS INDEX DOCD: CPT | Performed by: INTERNAL MEDICINE

## 2021-11-22 PROCEDURE — U0003 INFECTIOUS AGENT DETECTION BY NUCLEIC ACID (DNA OR RNA); SEVERE ACUTE RESPIRATORY SYNDROME CORONAVIRUS 2 (SARS-COV-2) (CORONAVIRUS DISEASE [COVID-19]), AMPLIFIED PROBE TECHNIQUE, MAKING USE OF HIGH THROUGHPUT TECHNOLOGIES AS DESCRIBED BY CMS-2020-01-R: HCPCS | Performed by: INTERNAL MEDICINE

## 2021-11-22 PROCEDURE — U0005 INFEC AGEN DETEC AMPLI PROBE: HCPCS | Performed by: INTERNAL MEDICINE

## 2021-11-22 PROCEDURE — 4004F PT TOBACCO SCREEN RCVD TLK: CPT | Performed by: INTERNAL MEDICINE

## 2021-11-23 LAB — SARS-COV-2 RNA RESP QL NAA+PROBE: NEGATIVE

## 2022-02-15 NOTE — TELEPHONE ENCOUNTER
LMOM for pt to call me at [facility] office    Dr Bing Jesus wants to know if pt scheduled his diabetic exam yet

## 2022-03-02 DIAGNOSIS — E11.9 DIABETES MELLITUS TYPE 2, NONINSULIN DEPENDENT (HCC): ICD-10-CM

## 2022-03-07 PROCEDURE — 4010F ACE/ARB THERAPY RXD/TAKEN: CPT | Performed by: INTERNAL MEDICINE

## 2022-03-16 DIAGNOSIS — E78.1 HYPERTRIGLYCERIDEMIA: ICD-10-CM

## 2022-03-16 DIAGNOSIS — I10 BENIGN ESSENTIAL HTN: ICD-10-CM

## 2022-03-16 DIAGNOSIS — E11.9 TYPE 2 DIABETES MELLITUS WITHOUT COMPLICATION, WITHOUT LONG-TERM CURRENT USE OF INSULIN (HCC): Primary | ICD-10-CM

## 2022-03-21 ENCOUNTER — APPOINTMENT (OUTPATIENT)
Dept: LAB | Age: 52
End: 2022-03-21
Payer: COMMERCIAL

## 2022-03-21 DIAGNOSIS — E11.9 TYPE 2 DIABETES MELLITUS WITHOUT COMPLICATION, WITHOUT LONG-TERM CURRENT USE OF INSULIN (HCC): ICD-10-CM

## 2022-03-21 DIAGNOSIS — K21.9 GASTROESOPHAGEAL REFLUX DISEASE WITHOUT ESOPHAGITIS: ICD-10-CM

## 2022-03-21 DIAGNOSIS — Z11.59 NEED FOR HEPATITIS C SCREENING TEST: ICD-10-CM

## 2022-03-21 LAB
ALBUMIN SERPL BCP-MCNC: 4.3 G/DL (ref 3.5–5)
ALP SERPL-CCNC: 57 U/L (ref 46–116)
ALT SERPL W P-5'-P-CCNC: 41 U/L (ref 12–78)
ANION GAP SERPL CALCULATED.3IONS-SCNC: 4 MMOL/L (ref 4–13)
AST SERPL W P-5'-P-CCNC: 18 U/L (ref 5–45)
BASOPHILS # BLD AUTO: 0.08 THOUSANDS/ΜL (ref 0–0.1)
BASOPHILS NFR BLD AUTO: 1 % (ref 0–1)
BILIRUB SERPL-MCNC: 0.66 MG/DL (ref 0.2–1)
BUN SERPL-MCNC: 14 MG/DL (ref 5–25)
CALCIUM SERPL-MCNC: 10.1 MG/DL (ref 8.3–10.1)
CHLORIDE SERPL-SCNC: 103 MMOL/L (ref 100–108)
CHOLEST SERPL-MCNC: 190 MG/DL
CO2 SERPL-SCNC: 28 MMOL/L (ref 21–32)
CREAT SERPL-MCNC: 1.08 MG/DL (ref 0.6–1.3)
CREAT UR-MCNC: 68.3 MG/DL
EOSINOPHIL # BLD AUTO: 0.05 THOUSAND/ΜL (ref 0–0.61)
EOSINOPHIL NFR BLD AUTO: 0 % (ref 0–6)
ERYTHROCYTE [DISTWIDTH] IN BLOOD BY AUTOMATED COUNT: 12.4 % (ref 11.6–15.1)
EST. AVERAGE GLUCOSE BLD GHB EST-MCNC: 263 MG/DL
GFR SERPL CREATININE-BSD FRML MDRD: 78 ML/MIN/1.73SQ M
GLUCOSE P FAST SERPL-MCNC: 323 MG/DL (ref 65–99)
HBA1C MFR BLD: 10.8 %
HCT VFR BLD AUTO: 46.3 % (ref 36.5–49.3)
HDLC SERPL-MCNC: 42 MG/DL
HGB BLD-MCNC: 15.9 G/DL (ref 12–17)
IMM GRANULOCYTES # BLD AUTO: 0.1 THOUSAND/UL (ref 0–0.2)
IMM GRANULOCYTES NFR BLD AUTO: 1 % (ref 0–2)
LDLC SERPL CALC-MCNC: 93 MG/DL (ref 0–100)
LYMPHOCYTES # BLD AUTO: 3.09 THOUSANDS/ΜL (ref 0.6–4.47)
LYMPHOCYTES NFR BLD AUTO: 22 % (ref 14–44)
MCH RBC QN AUTO: 29.9 PG (ref 26.8–34.3)
MCHC RBC AUTO-ENTMCNC: 34.3 G/DL (ref 31.4–37.4)
MCV RBC AUTO: 87 FL (ref 82–98)
MICROALBUMIN UR-MCNC: 18 MG/L (ref 0–20)
MICROALBUMIN/CREAT 24H UR: 26 MG/G CREATININE (ref 0–30)
MONOCYTES # BLD AUTO: 0.78 THOUSAND/ΜL (ref 0.17–1.22)
MONOCYTES NFR BLD AUTO: 6 % (ref 4–12)
NEUTROPHILS # BLD AUTO: 9.69 THOUSANDS/ΜL (ref 1.85–7.62)
NEUTS SEG NFR BLD AUTO: 70 % (ref 43–75)
NONHDLC SERPL-MCNC: 148 MG/DL
NRBC BLD AUTO-RTO: 0 /100 WBCS
PLATELET # BLD AUTO: 283 THOUSANDS/UL (ref 149–390)
PMV BLD AUTO: 9.3 FL (ref 8.9–12.7)
POTASSIUM SERPL-SCNC: 4.7 MMOL/L (ref 3.5–5.3)
PROT SERPL-MCNC: 7.5 G/DL (ref 6.4–8.2)
RBC # BLD AUTO: 5.31 MILLION/UL (ref 3.88–5.62)
SODIUM SERPL-SCNC: 135 MMOL/L (ref 136–145)
TRIGL SERPL-MCNC: 274 MG/DL
WBC # BLD AUTO: 13.79 THOUSAND/UL (ref 4.31–10.16)

## 2022-03-21 PROCEDURE — 83036 HEMOGLOBIN GLYCOSYLATED A1C: CPT | Performed by: INTERNAL MEDICINE

## 2022-03-21 PROCEDURE — 85025 COMPLETE CBC W/AUTO DIFF WBC: CPT | Performed by: INTERNAL MEDICINE

## 2022-03-21 PROCEDURE — 36415 COLL VENOUS BLD VENIPUNCTURE: CPT | Performed by: INTERNAL MEDICINE

## 2022-03-21 PROCEDURE — 82570 ASSAY OF URINE CREATININE: CPT | Performed by: INTERNAL MEDICINE

## 2022-03-21 PROCEDURE — 3046F HEMOGLOBIN A1C LEVEL >9.0%: CPT | Performed by: INTERNAL MEDICINE

## 2022-03-21 PROCEDURE — 86803 HEPATITIS C AB TEST: CPT

## 2022-03-21 PROCEDURE — 3061F NEG MICROALBUMINURIA REV: CPT | Performed by: INTERNAL MEDICINE

## 2022-03-21 PROCEDURE — 82043 UR ALBUMIN QUANTITATIVE: CPT | Performed by: INTERNAL MEDICINE

## 2022-03-21 PROCEDURE — 80053 COMPREHEN METABOLIC PANEL: CPT | Performed by: INTERNAL MEDICINE

## 2022-03-21 PROCEDURE — 80061 LIPID PANEL: CPT | Performed by: INTERNAL MEDICINE

## 2022-03-22 LAB — HCV AB SER QL: NORMAL

## 2022-03-24 ENCOUNTER — OFFICE VISIT (OUTPATIENT)
Dept: INTERNAL MEDICINE CLINIC | Facility: OTHER | Age: 52
End: 2022-03-24
Payer: COMMERCIAL

## 2022-03-24 VITALS
WEIGHT: 210 LBS | OXYGEN SATURATION: 99 % | HEIGHT: 69 IN | DIASTOLIC BLOOD PRESSURE: 92 MMHG | HEART RATE: 89 BPM | TEMPERATURE: 98 F | BODY MASS INDEX: 31.1 KG/M2 | SYSTOLIC BLOOD PRESSURE: 158 MMHG

## 2022-03-24 DIAGNOSIS — I10 BENIGN ESSENTIAL HTN: ICD-10-CM

## 2022-03-24 DIAGNOSIS — E78.1 HYPERTRIGLYCERIDEMIA: ICD-10-CM

## 2022-03-24 DIAGNOSIS — E11.9 TYPE 2 DIABETES MELLITUS WITHOUT COMPLICATION, WITHOUT LONG-TERM CURRENT USE OF INSULIN (HCC): Primary | ICD-10-CM

## 2022-03-24 DIAGNOSIS — L60.8 ONYCHOMADESIS OF TOENAIL: ICD-10-CM

## 2022-03-24 DIAGNOSIS — K21.9 GASTROESOPHAGEAL REFLUX DISEASE WITHOUT ESOPHAGITIS: ICD-10-CM

## 2022-03-24 PROBLEM — B34.9 VIRAL INFECTION, UNSPECIFIED: Status: RESOLVED | Noted: 2021-11-22 | Resolved: 2022-03-24

## 2022-03-24 PROBLEM — L02.215 PERINEAL ABSCESS: Status: RESOLVED | Noted: 2020-07-17 | Resolved: 2022-03-24

## 2022-03-24 PROCEDURE — 3008F BODY MASS INDEX DOCD: CPT | Performed by: INTERNAL MEDICINE

## 2022-03-24 PROCEDURE — 99214 OFFICE O/P EST MOD 30 MIN: CPT | Performed by: INTERNAL MEDICINE

## 2022-03-24 PROCEDURE — 3077F SYST BP >= 140 MM HG: CPT | Performed by: INTERNAL MEDICINE

## 2022-03-24 PROCEDURE — 3725F SCREEN DEPRESSION PERFORMED: CPT | Performed by: INTERNAL MEDICINE

## 2022-03-24 PROCEDURE — 4004F PT TOBACCO SCREEN RCVD TLK: CPT | Performed by: INTERNAL MEDICINE

## 2022-03-24 PROCEDURE — 3080F DIAST BP >= 90 MM HG: CPT | Performed by: INTERNAL MEDICINE

## 2022-03-24 RX ORDER — OMEPRAZOLE 40 MG/1
40 CAPSULE, DELAYED RELEASE ORAL DAILY
Qty: 90 CAPSULE | Refills: 1
Start: 2022-03-24 | End: 2022-06-28 | Stop reason: SDUPTHER

## 2022-03-24 RX ORDER — TERBINAFINE HYDROCHLORIDE 250 MG/1
250 TABLET ORAL DAILY
Qty: 42 TABLET | Refills: 1 | Status: SHIPPED | OUTPATIENT
Start: 2022-03-24 | End: 2022-06-16

## 2022-03-24 NOTE — PROGRESS NOTES
Diabetic Foot Exam    Patient's shoes and socks removed  Right Foot/Ankle   Right Foot Inspection  Skin Exam: skin intact, dry skin, callus and callus  No warmth, no erythema, no maceration, no abnormal color, no pre-ulcer and no ulcer  Toe Exam: ROM and strength within normal limits  No swelling, no tenderness, erythema and  no right toe deformity    Sensory   Monofilament testing: intact    Vascular  Capillary refills: < 3 seconds  The right DP pulse is 2+  The right PT pulse is 2+  Left Foot/Ankle  Left Foot Inspection  Skin Exam: skin intact, dry skin and callus  No warmth, no erythema, no maceration, no pre-ulcer and no ulcer  Toe Exam: ROM and strength within normal limits  No swelling, no tenderness, no erythema and no left toe deformity  Sensory   Monofilament testing: intact    Vascular  Capillary refills: < 3 seconds  The left DP pulse is 2+  The left PT pulse is 2+       Assign Risk Category  No deformity present  No loss of protective sensation  No weak pulses  Risk: 0

## 2022-03-24 NOTE — ASSESSMENT & PLAN NOTE
Lab Results   Component Value Date    HGBA1C 10 8 (H) 03/21/2022   Uncontrolled  Continue glimepiride 4 milligrams b i d , metformin 1000 milligrams b i d , Januvia 100 milligrams daily, will start Farxiga 5 milligrams daily  Discussed medication compliance as well as compliance to a diabetic diet

## 2022-03-24 NOTE — PATIENT INSTRUCTIONS
10% - bad control"> 10% - bad control,Hemoglobin A1c (HbA1c) greater than 10% indicating poor diabetic control,Haemoglobin A1c greater than 10% indicating poor diabetic control">   Diabetes Mellitus Type 2 in Adults, Ambulatory Care   GENERAL INFORMATION:   Diabetes mellitus type 2  is a disease that affects how your body uses glucose (sugar)  Insulin helps move sugar out of the blood so it can be used for energy  Normally, when the blood sugar level increases, the pancreas makes more insulin  Type 2 diabetes develops because either the body cannot make enough insulin, or it cannot use the insulin correctly  After many years, your pancreas may stop making insulin  Common symptoms include the following:   · More hunger or thirst than usual     · Frequent urination     · Weight loss without trying     · Blurred vision  Seek immediate care for the following symptoms:   · Severe abdominal pain, or pain that spreads to your back  You may also be vomiting  · Trouble staying awake or focusing    · Shaking or sweating    · Blurred or double vision    · Breath has a fruity, sweet smell    · Breathing is deep and labored, or rapid and shallow    · Heartbeat is fast and weak  Treatment for diabetes mellitus type 2  includes keeping your blood sugar at a normal level  You must eat the right foods, and exercise regularly  You may also need medicine if you cannot control your blood sugar level with nutrition and exercise  Manage diabetes mellitus type 2:   · Check your blood sugar level  You will be taught how to check a small drop of blood in a glucose monitor  Ask your healthcare provider when and how often to check during the day  Ask your healthcare provider what your blood sugar levels should be when you check them  · Keep track of carbohydrates (sugar and starchy foods)  Your blood sugar level can get too high if you eat too many carbohydrates   Your dietitian will help you plan meals and snacks that have the right amount of carbohydrates  · Eat low-fat foods  Some examples are skinless chicken and low-fat milk  · Eat less sodium (salt)  Some examples of high-sodium foods to limit are soy sauce, potato chips, and soup  Do not add salt to food you cook  Limit your use of table salt  · Eat high-fiber foods  Foods that are a good source of fiber include vegetables, whole grain bread, and beans  · Limit alcohol  Alcohol affects your blood sugar level and can make it harder to manage your diabetes  Women should limit alcohol to 1 drink a day  Men should limit alcohol to 2 drinks a day  A drink of alcohol is 12 ounces of beer, 5 ounces of wine, or 1½ ounces of liquor  · Get regular exercise  Exercise can help keep your blood sugar level steady, decrease your risk of heart disease, and help you lose weight  Exercise for at least 30 minutes, 5 days a week  Include muscle strengthening activities 2 days each week  Work with your healthcare provider to create an exercise plan  · Check your feet each day  for injuries or open sores  Ask your healthcare provider for activities you can do if you have an open sore  · Quit smoking  If you smoke, it is never too late to quit  Smoking can worsen the problems that may occur with diabetes  Ask your healthcare provider for information about how to stop smoking if you are having trouble quitting  · Ask about your weight:  Ask healthcare providers if you need to lose weight, and how much to lose  Ask them to help you with a weight loss program  Even a 10 to 15 pound weight loss can help you manage your blood sugar level  · Carry medical alert identification  Wear medical alert jewelry or carry a card that says you have diabetes  Ask your healthcare provider where to get these items  · Ask about vaccines  Diabetes puts you at risk of serious illness if you get the flu, pneumonia, or hepatitis   Ask your healthcare provider if you should get a flu, pneumonia, or hepatitis B vaccine, and when to get the vaccine  Follow up with your healthcare provider as directed:  Write down your questions so you remember to ask them during your visits  CARE AGREEMENT:   You have the right to help plan your care  Learn about your health condition and how it may be treated  Discuss treatment options with your caregivers to decide what care you want to receive  You always have the right to refuse treatment  The above information is an  only  It is not intended as medical advice for individual conditions or treatments  Talk to your doctor, nurse or pharmacist before following any medical regimen to see if it is safe and effective for you  © 2014 [address] is for End User's use only and may not be sold, redistributed or otherwise used for commercial purposes  All illustrations and images included in CareNotes® are the copyrighted property of [employer]  or Neal Roberts  Type 2 Diabetes in Adults: New Diagnosis   AMBULATORY CARE:   Type 2 diabetes  is a disease that affects how your body uses glucose (sugar)  Normally, when the blood sugar level increases, the pancreas makes more insulin  Insulin helps move sugar out of the blood so it can be used for energy  Type 2 diabetes develops because either the body cannot make enough insulin, or it cannot use the insulin correctly  Type 2 diabetes can be controlled to prevent damage to your heart, blood vessels, and other organs  Common symptoms include the following:   · Numbness in your fingers or toes    · Blurred vision    · Frequent urination    · More hunger or thirst than usual    Have someone call your local emergency number (911 in the [address] [place] [address],[address]) if:   · You have any of the following signs of a stroke:      ? Numbness or drooping on one side of your face     ? Weakness in an arm or leg    ? Confusion or difficulty speaking    ?  Dizziness, a severe headache, or vision loss    · You have any of the following signs of a heart attack:      ? Squeezing, pressure, or pain in your chest    ? You may  also have any of the following:     § Discomfort or pain in your back, neck, jaw, stomach, or arm    § Shortness of breath    § Nausea or vomiting    § Lightheadedness or a sudden cold sweat    · You have trouble breathing  Seek care immediately if:   · You have severe abdominal pain  · You vomit for more than 2 hours  · You have trouble staying awake or focusing  · You are shaking or sweating  · You feel weak or more tired than usual     · You have blurred or double vision  · Your breath has a fruity, sweet smell  Call your doctor or diabetes care team if:   · Your arms and legs are swollen  · You have an upset stomach and cannot eat the foods on your meal plan  · You feel dizzy, have headaches, or are easily irritated  · Your skin is red, warm, dry, or swollen  · You have a wound that does not heal     · You have numbness in your arms or legs  · You have trouble coping with your illness, or you feel anxious or depressed  · You have questions or concerns about your condition or care  Treatment for type 2 diabetes  helps prevent or delay complications, including heart and kidney disease  You must eat healthy meals and do regular physical activity  You may  need any of the following:  · Diabetes medicines or insulin  may be given to decrease the amount of sugar in your blood  · Blood pressure medicine  may be given to lower your blood pressure  · Cholesterol-lowering medicine  may be given to prevent heart disease  · Antiplatelets , such as aspirin, help prevent blood clots  Take your antiplatelet medicine exactly as directed  These medicines make it more likely for you to bleed or bruise  If you are told to take aspirin, do not take acetaminophen or ibuprofen instead  · Take your medicine as directed    Contact your healthcare provider if you think your medicine is not helping or if you have side effects  Tell him or her if you are allergic to any medicine  Keep a list of the medicines, vitamins, and herbs you take  Include the amounts, and when and why you take them  Bring the list or the pill bottles to follow-up visits  Carry your medicine list with you in case of an emergency  Diabetes education:  Diabetes education will start right away  Diabetes education may also happen later to refresh your memory  Your diabetes care team may include physicians, nurse practitioners, and physician assistants  It may also include nurses, dietitians, exercise specialists, pharmacists, dentists, and podiatrists  Family members, or others who are close to you, may also be part of the team  You and your team will make goals and plans to manage diabetes and other health problems  The plans and goals will be specific to your needs  Members of your diabetes care team teach you the following:  · About nutrition:  A dietitian will help you make a meal plan to keep your blood sugar level steady  You will learn how food affects your blood sugar levels  You will also learn to keep track of sugar and starchy foods (carbohydrates)  Do not skip meals  Your blood sugar level may drop too low if you have taken diabetes medicine and do not eat  You may be taught to use the plate method for portion control  With the plate method, ½ of your plate contains vegetables  The other half is divided so that ¼ contains protein or meat, and ¼ contains starches, such as potatoes  · About physical activity and diabetes: You will learn why physical activity, such as walking, is important  You and your care team provider will make a plan for your activity  Your care team provider will tell you what a healthy weight is for you  He or she will help you make a plan to get to that weight and stay there   Maintain a healthy weight to help delay or prevent complications of diabetes  · About your blood sugar level: You will learn what your blood sugar level should be  You will be given information on when and how to check your blood sugar level  You may need to check by testing a drop of blood in a glucose monitor  You may instead be given a continuous glucose monitoring (CGM) device  A sensor is placed in your abdomen or on your arm  You put a transmitter on the sensor to get a reading that shows up on the monitor  You will learn what to do if your level is too high or too low  Write down the times of your checks and your levels  Take them to all follow-up appointments  · About diabetes medicine: You may need oral diabetes medicine, insulin, or both to help control your blood sugar levels  Your healthcare provider will teach you how and when to take oral diabetes medicine  You will also be taught about side effects oral diabetes medicine can cause  Insulin may be added if oral diabetes medicine becomes less effective over time  Insulin may be injected, or given through a pump or pen  You and your care team will discuss which method is best for you  ? An insulin pump  is an implanted device that gives your insulin 24 hours a day  An insulin pump prevents the need for multiple insulin injections in a day  ? An insulin pen  is a device prefilled with the right amount of insulin  ? You and your family members will be taught how to draw up and give insulin  if this is the best method for you  Your education team will also teach you how to dispose of needles and syringes  ? You will learn how much insulin you need  and when to give it  You will be taught when not to give insulin  You will also be taught what to do if your blood sugar level drops too low  This may happen if you take insulin and do not eat the right amount of carbohydrates      Other ways to help manage type 2 diabetes:   · Talk to your care team if you have increased stress about your diagnosis  When you feel stressed about your diagnosis, it can cause you not to take care of yourself properly  Your care team can help by offering tips about self-care  Your care team may suggest you talk to a mental health provider  The provider can listen and offer help with self-care issues  · Check your feet each day for sores  Wear shoes and socks that fit correctly  Do not trim your toenails  Go to a podiatrist  Ask your care team for more information about foot care  · Do not smoke  Nicotine and other chemicals in cigarettes and cigars can cause lung damage and make diabetes harder to manage  Ask your care team provider for information if you currently smoke and need help to quit  E-cigarettes or smokeless tobacco still contain nicotine  Talk to your care team provider before you use these products  · Drink water instead of sugary drinks such as soft drinks and fruit juices  Sugary drinks cause high blood sugar and cause an increase in your weight  Your healthcare provider may tell you that diet drinks do not help with weight loss  · Know the risks if you choose to drink alcohol  Alcohol can cause your blood sugar levels to be low if you use insulin  Alcohol can cause high blood sugar levels and weight gain if you drink too much  A drink of alcohol is 12 ounces of beer, 5 ounces of wine, or 1½ ounces of liquor  Your care team can tell you how many drinks are okay to have in 24 hours and in 1 week  · Check your blood pressure as directed  If you have high blood pressure (BP), talk to your care team about your BP goals  Together you can create a plan to lower your BP if needed and keep it in a healthy range  The plan may include lifestyle changes or medicines  A normal BP is 119/79 or lower  A normal blood pressure can help prevent or delay certain complications from diabetes  Examples include retinopathy (eye damage) and kidney damage           · Wear medical alert identification  Wear medical alert jewelry or carry a card that says you have diabetes  Ask your care team provider where to get these items  · Ask about vaccines you may need  You have a higher risk for serious illness if you get the flu, pneumonia, COVID-19, or hepatitis  Ask your provider if you should get a flu, pneumonia, or hepatitis B vaccine, and when to get the COVID-19 vaccine  Risks of type 2 diabetes: It is important to learn to keep your diabetes in control  Uncontrolled diabetes can damage your nerves, veins, and arteries  Your risk for dementia increases faster the longer your diabetes is not controlled  High blood sugar levels may damage other body tissues and organs over time  Damage to arteries may increase your risk for heart attack and stroke  Nerve damage may also lead to other heart, stomach, and nerve problems  Diabetes can become life-threatening if it is not controlled  Follow up with your care team providers as directed: You may need to return to have your A1c checked every 3 months  You will need to have your feet checked during at least 1 visit each year  You will need an eye exam 1 time each year to check for retinopathy  You will also need urine tests every year to check for kidney problems  You may need tests to monitor for heart disease, such as an EKG, stress test, blood pressure monitoring, and blood tests  Write down your questions so you remember to ask them during your visits  © Copyright [address] 2022 Information is for End User's use only and may not be sold, redistributed or otherwise used for commercial purposes  All illustrations and images included in CareNotes® are the copyrighted property of [employer]  or Tremaine Mays  The above information is an  only  It is not intended as medical advice for individual conditions or treatments   Talk to your doctor, nurse or pharmacist before following any medical regimen to see if it is safe and effective for you

## 2022-03-24 NOTE — PROGRESS NOTES
Assessment/Plan:    GERD (gastroesophageal reflux disease)  Will increase omeprazole from 20 milligrams daily to 40 milligrams daily  If symptoms remain uncontrolled, will increase to 40 milligrams twice a day  Type 2 diabetes mellitus without complication, without long-term current use of insulin ([facility])    Lab Results   Component Value Date    HGBA1C 10 8 (H) 03/21/2022   Uncontrolled  Continue glimepiride 4 milligrams b i d , metformin 1000 milligrams b i d , Januvia 100 milligrams daily, will start Farxiga 5 milligrams daily  Discussed medication compliance as well as compliance to a diabetic diet  Benign essential HTN  Elevated today however overall well controlled  Continue losartan 100 milligrams daily  Recheck in 1 month  BMI 32 0-32 9,adult  Discussed diet and exercise  Hypertriglyceridemia  Discussed following a well-balanced diet and restarting Omega 3 supplementation at 2 grams b i d  Diagnoses and all orders for this visit:    Type 2 diabetes mellitus without complication, without long-term current use of insulin ([facility])  -     Hemoglobin A1C; Future  -     Dapagliflozin Propanediol (Farxiga) 5 MG TABS; Take 1 tablet (5 mg total) by mouth in the morning    Gastroesophageal reflux disease without esophagitis  -     omeprazole (PriLOSEC) 40 MG capsule; Take 1 capsule (40 mg total) by mouth daily    Onychomadesis of toenail  -     Comprehensive metabolic panel; Future  -     terbinafine (LamISIL) 250 mg tablet; Take 1 tablet (250 mg total) by mouth daily    Benign essential HTN    BMI 32 0-32 9,adult    Hypertriglyceridemia                  Subjective:      Patient ID: Joanne Hughes is a 45 y o  male  Chief Complaint   Patient presents with    Follow-up     Pt here for overdue follow up     Hypertension     Patient has not been compliant with medication at home        Diabetes     Hemoglobin A1C @ 10 8  blood work done on 3/21/2022    Hand Pain     Left hand pain located between little finger and wrist, off and on sharp pain       45year old male is seen today for follow up of chronic conditions  Lab studies reviewed today, hemoglobin A1c uncontrolled at 10 8 percent  Lipid panel shows elevation and triglycerides in the 200s  LDL is within acceptable range  CMP is within acceptable range  Hepatitis-C antibody negative  Microalbumin: Creatinine ratio is within acceptable range  DM2: Uncontrolled  He has been taking     He has been experiencing left lateral digit 5 knuckle pain since 2 weeks  He denies any known injury  He is also concerned for onychomycosis  Hypertension  This is a chronic problem  The current episode started more than 1 year ago  The problem is uncontrolled  Pertinent negatives include no chest pain, headaches, palpitations or shortness of breath  Past treatments include angiotensin blockers  The current treatment provides mild improvement  Compliance problems include exercise and diet  Diabetes  He presents for his follow-up diabetic visit  He has type 2 diabetes mellitus  His disease course has been worsening  Pertinent negatives for hypoglycemia include no dizziness or headaches  There are no diabetic associated symptoms  Pertinent negatives for diabetes include no chest pain, no fatigue and no weakness  Current diabetic treatment includes diet and oral agent (triple therapy)  He is compliant with treatment some of the time  An ACE inhibitor/angiotensin II receptor blocker is being taken  Hand Pain   The incident occurred more than 1 week ago  There was no injury mechanism  Pertinent negatives include no chest pain or numbness  Hyperlipidemia  This is a chronic problem  The current episode started more than 1 year ago  The problem is uncontrolled  Recent lipid tests were reviewed and are variable  Pertinent negatives include no chest pain or shortness of breath  He is currently on no antihyperlipidemic treatment     Heartburn  He reports no abdominal pain, no chest pain, no choking, no coughing, no nausea, no sore throat or no wheezing  This is a chronic problem  The current episode started more than 1 year ago  The problem occurs frequently  The problem has been unchanged  Pertinent negatives include no fatigue  He has tried a PPI for the symptoms  The treatment provided mild relief  The following portions of the patient's history were reviewed and updated as appropriate: allergies, current medications, past family history, past medical history, past social history, past surgical history and problem list     Review of Systems   Constitutional: Negative for activity change, appetite change, chills, diaphoresis, fatigue and fever  HENT: Negative for congestion, postnasal drip, rhinorrhea, sinus pressure, sinus pain, sneezing and sore throat  Eyes: Negative for visual disturbance  Respiratory: Negative for apnea, cough, choking, chest tightness, shortness of breath and wheezing  Cardiovascular: Negative for chest pain, palpitations and leg swelling  Gastrointestinal: Negative for abdominal distention, abdominal pain, anal bleeding, blood in stool, constipation, diarrhea, nausea and vomiting  Endocrine: Negative for cold intolerance and heat intolerance  Genitourinary: Negative for difficulty urinating, dysuria and hematuria  Musculoskeletal: Negative  Skin: Negative  Neurological: Negative for dizziness, weakness, light-headedness, numbness and headaches  Hematological: Negative for adenopathy  Psychiatric/Behavioral: Negative for agitation, sleep disturbance and suicidal ideas  All other systems reviewed and are negative          Past Medical History:   Diagnosis Date    Cellulitis of elbow     last assessed - [id number]    Diabetes mellitus ([facility] [address] )     Diverticulitis     Fatigue     last assessed - [id number]    GERD (gastroesophageal reflux disease)     History of gastroesophageal reflux (GERD)     Resolved - [id number]    Hypertension     Iron overload     last assessed - [id number]    Low HDL (under 40)     last assessed - [id number]    Perineal abscess 7/17/2020    Tinea pedis of both feet     last assessed - [id number]         Current Outpatient Medications:     Cholecalciferol 25 MCG (1000 UT) tablet, Take 4,000 Units by mouth daily  , Disp: , Rfl:     glimepiride (AMARYL) 4 mg tablet, Take 1 tablet (4 mg total) by mouth 2 (two) times a day, Disp: 180 tablet, Rfl: 1    losartan (COZAAR) 100 MG tablet, Take 1 tablet (100 mg total) by mouth daily, Disp: 90 tablet, Rfl: 0    metFORMIN (GLUCOPHAGE) 1000 MG tablet, Take 1 tablet (1,000 mg total) by mouth 2 (two) times a day with meals, Disp: 60 tablet, Rfl: 0    omeprazole (PriLOSEC) 40 MG capsule, Take 1 capsule (40 mg total) by mouth daily, Disp: 90 capsule, Rfl: 1    sitaGLIPtin (Januvia) 100 mg tablet, Take 1 tablet (100 mg total) by mouth daily, Disp: 90 tablet, Rfl: 1    Dapagliflozin Propanediol (Farxiga) 5 MG TABS, Take 1 tablet (5 mg total) by mouth in the morning, Disp: 90 tablet, Rfl: 1    Multiple Vitamins-Minerals (MULTIVITAMIN ADULT) TABS, Take 1 tablet by mouth daily (Patient not taking: Reported on 11/22/2021 ), Disp: , Rfl:     Omega 3-6-9 Fatty Acids (OMEGA 3-6-9 COMPLEX PO), Take 1 capsule by mouth daily (Patient not taking: Reported on 8/16/2021), Disp: , Rfl:     terbinafine (LamISIL) 250 mg tablet, Take 1 tablet (250 mg total) by mouth daily, Disp: 42 tablet, Rfl: 1    No Known Allergies    Social History   Past Surgical History:   Procedure Laterality Date    TONSILLECTOMY       Family History   Problem Relation Age of Onset    Leukemia Father     Diabetes Family     Hypertension Family     Diabetes Mother        Objective:  /92 (BP Location: Left arm, Patient Position: Sitting, Cuff Size: Adult)   Pulse 89   Temp 98 °F (36 7 °C)   Ht 5' 8 5" (1 74 m)   Wt 95 3 kg (210 lb)   SpO2 99%   BMI 31 47 kg/m²     Recent Results (from the past 1344 hour(s))   CBC and differential    Collection Time: 03/21/22  4:34 PM   Result Value Ref Range    WBC 13 79 (H) 4 31 - 10 16 Thousand/uL    RBC 5 31 3 88 - 5 62 Million/uL    Hemoglobin 15 9 12 0 - 17 0 g/dL    Hematocrit 46 3 36 5 - 49 3 %    MCV 87 82 - 98 fL    MCH 29 9 26 8 - 34 3 pg    MCHC 34 3 31 4 - 37 4 g/dL    RDW 12 4 11 6 - 15 1 %    MPV 9 3 8 9 - 12 7 fL    Platelets 574 185 - 759 Thousands/uL    nRBC 0 /100 WBCs    Neutrophils Relative 70 43 - 75 %    Immat GRANS % 1 0 - 2 %    Lymphocytes Relative 22 14 - 44 %    Monocytes Relative 6 4 - 12 %    Eosinophils Relative 0 0 - 6 %    Basophils Relative 1 0 - 1 %    Neutrophils Absolute 9 69 (H) 1 85 - 7 62 Thousands/µL    Immature Grans Absolute 0 10 0 00 - 0 20 Thousand/uL    Lymphocytes Absolute 3 09 0 60 - 4 47 Thousands/µL    Monocytes Absolute 0 78 0 17 - 1 22 Thousand/µL    Eosinophils Absolute 0 05 0 00 - 0 61 Thousand/µL    Basophils Absolute 0 08 0 00 - 0 10 Thousands/µL   Comprehensive metabolic panel    Collection Time: 03/21/22  4:34 PM   Result Value Ref Range    Sodium 135 (L) 136 - 145 mmol/L    Potassium 4 7 3 5 - 5 3 mmol/L    Chloride 103 100 - 108 mmol/L    CO2 28 21 - 32 mmol/L    ANION GAP 4 4 - 13 mmol/L    BUN 14 5 - 25 mg/dL    Creatinine 1 08 0 60 - 1 30 mg/dL    Glucose, Fasting 323 (H) 65 - 99 mg/dL    Calcium 10 1 8 3 - 10 1 mg/dL    AST 18 5 - 45 U/L    ALT 41 12 - 78 U/L    Alkaline Phosphatase 57 46 - 116 U/L    Total Protein 7 5 6 4 - 8 2 g/dL    Albumin 4 3 3 5 - 5 0 g/dL    Total Bilirubin 0 66 0 20 - 1 00 mg/dL    eGFR 78 ml/min/1 73sq m   Hemoglobin A1C    Collection Time: 03/21/22  4:34 PM   Result Value Ref Range    Hemoglobin A1C 10 8 (H) Normal 3 8-5 6%; PreDiabetic 5 7-6 4%;  Diabetic >=6 5%; Glycemic control for adults with diabetes <7 0% %     mg/dl   Lipid panel    Collection Time: 03/21/22  4:34 PM   Result Value Ref Range    Cholesterol 190 See Comment mg/dL    Triglycerides 274 (H) See Comment mg/dL    HDL, Direct 42 >=40 mg/dL    LDL Calculated 93 0 - 100 mg/dL    Non-HDL-Chol (CHOL-HDL) 148 mg/dl   Microalbumin / creatinine urine ratio    Collection Time: 03/21/22  4:34 PM   Result Value Ref Range    Creatinine, Ur 68 3 mg/dL    Microalbum  ,U,Random 18 0 0 0 - 20 0 mg/L    Microalb Creat Ratio 26 0 - 30 mg/g creatinine   Hepatitis C antibody    Collection Time: 03/21/22  4:34 PM   Result Value Ref Range    Hepatitis C Ab Non-reactive Non-reactive            Physical Exam  Vitals and nursing note reviewed  Constitutional:       General: He is not in acute distress  Appearance: He is well-developed  He is not diaphoretic  HENT:      Head: Normocephalic and atraumatic  Eyes:      General: No scleral icterus  Right eye: No discharge  Left eye: No discharge  Conjunctiva/sclera: Conjunctivae normal       Pupils: Pupils are equal, round, and reactive to light  Neck:      Thyroid: No thyromegaly  Vascular: No JVD  Cardiovascular:      Rate and Rhythm: Normal rate and regular rhythm  Heart sounds: Normal heart sounds  No murmur heard  No friction rub  No gallop  Pulmonary:      Effort: Pulmonary effort is normal  No respiratory distress  Breath sounds: Normal breath sounds  No wheezing or rales  Chest:      Chest wall: No tenderness  Abdominal:      General: Bowel sounds are normal  There is no distension  Palpations: Abdomen is soft  There is no mass  Tenderness: There is no abdominal tenderness  There is no guarding or rebound  Musculoskeletal:         General: No tenderness or deformity  Normal range of motion  Cervical back: Normal range of motion and neck supple  Lymphadenopathy:      Cervical: No cervical adenopathy  Skin:     General: Skin is warm and dry  Coloration: Skin is not pale  Findings: No erythema or rash  Neurological:      Mental Status: He is alert and oriented to person, place, and time  Cranial Nerves: No cranial nerve deficit  Coordination: Coordination normal       Deep Tendon Reflexes: Reflexes are normal and symmetric  Psychiatric:         Behavior: Behavior normal          Thought Content:  Thought content normal          Judgment: Judgment normal

## 2022-03-24 NOTE — ASSESSMENT & PLAN NOTE
(4) no limitation Will increase omeprazole from 20 milligrams daily to 40 milligrams daily  If symptoms remain uncontrolled, will increase to 40 milligrams twice a day

## 2022-06-27 DIAGNOSIS — K21.9 GASTROESOPHAGEAL REFLUX DISEASE WITHOUT ESOPHAGITIS: ICD-10-CM

## 2022-06-27 RX ORDER — OMEPRAZOLE 20 MG/1
20 CAPSULE, DELAYED RELEASE ORAL DAILY
Qty: 90 CAPSULE | Refills: 1 | OUTPATIENT
Start: 2022-06-27

## 2022-06-27 RX ORDER — OMEPRAZOLE 40 MG/1
40 CAPSULE, DELAYED RELEASE ORAL DAILY
Qty: 90 CAPSULE | Refills: 1
Start: 2022-06-27

## 2022-06-28 DIAGNOSIS — K21.9 GASTROESOPHAGEAL REFLUX DISEASE WITHOUT ESOPHAGITIS: ICD-10-CM

## 2022-06-28 RX ORDER — OMEPRAZOLE 40 MG/1
40 CAPSULE, DELAYED RELEASE ORAL DAILY
Qty: 90 CAPSULE | Refills: 1 | Status: SHIPPED | OUTPATIENT
Start: 2022-06-28

## 2022-07-10 ENCOUNTER — OFFICE VISIT (OUTPATIENT)
Dept: URGENT CARE | Age: 52
End: 2022-07-10
Payer: COMMERCIAL

## 2022-07-10 VITALS
TEMPERATURE: 97.2 F | WEIGHT: 205 LBS | HEIGHT: 70 IN | HEART RATE: 88 BPM | RESPIRATION RATE: 20 BRPM | SYSTOLIC BLOOD PRESSURE: 163 MMHG | OXYGEN SATURATION: 99 % | BODY MASS INDEX: 29.35 KG/M2 | DIASTOLIC BLOOD PRESSURE: 102 MMHG

## 2022-07-10 DIAGNOSIS — R11.2 NAUSEA VOMITING AND DIARRHEA: Primary | ICD-10-CM

## 2022-07-10 DIAGNOSIS — R19.7 NAUSEA VOMITING AND DIARRHEA: Primary | ICD-10-CM

## 2022-07-10 PROCEDURE — 99213 OFFICE O/P EST LOW 20 MIN: CPT | Performed by: PHYSICIAN ASSISTANT

## 2022-07-10 NOTE — PROGRESS NOTES
[address]        NAME: Brianna Pena is a 47 y o  male  : 1970    MRN: [id number]  DATE: July 10, 2022  TIME: 11:20 AM    Assessment and Plan   Nausea vomiting and diarrhea [R11 2, R19 7]  1  Nausea vomiting and diarrhea     Pt presents with symptoms of nauesa, vomiting, and diarrhea which have now resolved  We discussed BRAT diet and smaller meals the next few days and he is provided with a note to return to work  Patient Instructions     Patient Instructions   Continue BRAT (broth, bananas, rice, apples, and toast or crackers) for the next 3-5 days  Continue small meals (5 meals) instead of 3 large meals for the next 3-5 days  Follow-up with PCP in 3-5 days  Report to the emergency department if symptoms recur or worsen  Chief Complaint     Chief Complaint   Patient presents with    Abdominal Pain     Vomiting, diarrhea, abdominal pain x5days          History of Present Illness       47year old male presents for note to return to work  Pt reports that he has had nausea, vomiting, and diarrhea on and off since Tuesday with associated belly pain  Pt thinks he may have eaten some bad pork leftovers from the  holiday weekend  Pt reports he was able to eat some chicken soup last night and had a normal bowel movent this morning  Symptoms are resolved at this time  Pt reports he tried to get in with his PCP, but they did not have any appointments available  He reports negative COVID test at home at symptom onset and again yesterday  No other concerns or complaints today  Review of Systems   Review of Systems   Constitutional: Negative for chills and fever  Gastrointestinal: Positive for abdominal pain, diarrhea, nausea and vomiting           Current Medications       Current Outpatient Medications:     Dapagliflozin Propanediol (Farxiga) 5 MG TABS, Take 1 tablet (5 mg total) by mouth in the morning, Disp: 90 tablet, Rfl: 1    glimepiride (AMARYL) 4 mg tablet, Take 1 tablet (4 mg total) by mouth 2 (two) times a day, Disp: 180 tablet, Rfl: 1    losartan (COZAAR) 100 MG tablet, Take 1 tablet (100 mg total) by mouth daily, Disp: 90 tablet, Rfl: 0    metFORMIN (GLUCOPHAGE) 1000 MG tablet, Take 1 tablet (1,000 mg total) by mouth 2 (two) times a day with meals, Disp: 60 tablet, Rfl: 0    omeprazole (PriLOSEC) 40 MG capsule, Take 1 capsule (40 mg total) by mouth daily, Disp: 90 capsule, Rfl: 1    sitaGLIPtin (Januvia) 100 mg tablet, Take 1 tablet (100 mg total) by mouth daily, Disp: 90 tablet, Rfl: 1    Cholecalciferol 25 MCG (1000 UT) tablet, Take 4,000 Units by mouth daily  , Disp: , Rfl:     Multiple Vitamins-Minerals (MULTIVITAMIN ADULT) TABS, Take 1 tablet by mouth daily (Patient not taking: Reported on 11/22/2021 ), Disp: , Rfl:     Omega 3-6-9 Fatty Acids (OMEGA 3-6-9 COMPLEX PO), Take 1 capsule by mouth daily (Patient not taking: Reported on 8/16/2021), Disp: , Rfl:     Current Allergies     Allergies as of 07/10/2022    (No Known Allergies)            The following portions of the patient's history were reviewed and updated as appropriate: allergies, current medications, past family history, past medical history, past social history, past surgical history and problem list      Past Medical History:   Diagnosis Date    Cellulitis of elbow     last assessed - [id number]    Diabetes mellitus ([facility] [address] )     Diverticulitis     Fatigue     last assessed - [id number]    GERD (gastroesophageal reflux disease)     History of gastroesophageal reflux (GERD)     Resolved - [id number]    Hypertension     Iron overload     last assessed - [id number]    Low HDL (under 40)     last assessed - [id number]    Perineal abscess 7/17/2020    Tinea pedis of both feet     last assessed - [id number]       Past Surgical History:   Procedure Laterality Date    TONSILLECTOMY         Family History   Problem Relation Age of Onset    Leukemia Father     Diabetes Family     Hypertension Family     Diabetes Mother          Medications have been verified  Objective   BP (!) 163/102   Pulse 88   Temp (!) 97 2 °F (36 2 °C)   Resp 20   Ht 5' 10" (1 778 m)   Wt 93 kg (205 lb)   SpO2 99%   BMI 29 41 kg/m²   No LMP for male patient  Physical Exam     Physical Exam  Vitals and nursing note reviewed  Constitutional:       General: He is awake  He is not in acute distress  Appearance: Normal appearance  He is well-developed and well-groomed  He is not ill-appearing, toxic-appearing or diaphoretic  HENT:      Head: Normocephalic and atraumatic  Right Ear: Hearing and external ear normal       Left Ear: Hearing and external ear normal    Eyes:      General: Lids are normal  Vision grossly intact  Gaze aligned appropriately  Cardiovascular:      Rate and Rhythm: Normal rate  Pulmonary:      Effort: Pulmonary effort is normal       Comments: Patient is speaking in full sentences with no increased respiratory effort  No audible wheezing or stridor  Abdominal:      General: Abdomen is flat  Bowel sounds are normal       Palpations: Abdomen is soft  There is no shifting dullness, fluid wave, hepatomegaly, splenomegaly, mass or pulsatile mass  Tenderness: There is generalized abdominal tenderness (mild with deep palpation only)  Musculoskeletal:      Cervical back: Normal range of motion  Skin:     General: Skin is warm and dry  Neurological:      Mental Status: He is alert and oriented to person, place, and time  Coordination: Coordination is intact  Gait: Gait is intact  Psychiatric:         Attention and Perception: Attention and perception normal          Mood and Affect: Mood and affect normal          Speech: Speech normal          Behavior: Behavior normal  Behavior is cooperative  Note: Portions of this record may have been created with voice recognition software   Occasional wrong word or "sound a like" substitutions may have occurred due to the inherent limitations of voice recognition software  Please read the chart carefully and recognize, using context, where substitutions have occurred  *

## 2022-07-10 NOTE — LETTER
July 10, 2022     Patient: Rajan Obregon   YOB: 1970   Date of Visit: 7/10/2022       To Whom It May Concern: It is my medical opinion that Keysha Dockery may return to full duty immediately with no restrictions  If you have any questions or concerns, please don't hesitate to call           Sincerely,        Xavier Noriega PA-C    CC: No Recipients

## 2022-09-27 ENCOUNTER — RA CDI HCC (OUTPATIENT)
Dept: OTHER | Facility: HOSPITAL | Age: 52
End: 2022-09-27

## 2022-09-27 NOTE — PROGRESS NOTES
[facility]  coding opportunities          Chart Reviewed number of suggestions sent to Provider: 1   [id number]    Patients Insurance        Commercial Insurance: [employer]

## 2022-10-11 ENCOUNTER — VBI (OUTPATIENT)
Dept: ADMINISTRATIVE | Facility: OTHER | Age: 52
End: 2022-10-11

## 2022-10-11 NOTE — TELEPHONE ENCOUNTER
10/11/22 9:31 AM     See documentation in the VB [employer] SmartForm       [facility]

## 2022-11-28 ENCOUNTER — TELEPHONE (OUTPATIENT)
Dept: INTERNAL MEDICINE CLINIC | Facility: OTHER | Age: 52
End: 2022-11-28

## 2022-11-28 NOTE — TELEPHONE ENCOUNTER
----- Message from Tino Whiteside MD sent at 11/22/2022  3:22 PM EST -----  Please send patient pre-dismissal letter. ----- Message -----  From: Rica Hughes  Sent: 11/22/2022   9:16 AM EST  To: Tino Whiteside MD    Pt cancelled last 4 appts. Please advise.

## 2022-12-28 ENCOUNTER — OFFICE VISIT (OUTPATIENT)
Dept: INTERNAL MEDICINE CLINIC | Facility: OTHER | Age: 52
End: 2022-12-28

## 2022-12-28 VITALS
RESPIRATION RATE: 18 BRPM | HEIGHT: 70 IN | DIASTOLIC BLOOD PRESSURE: 96 MMHG | SYSTOLIC BLOOD PRESSURE: 142 MMHG | BODY MASS INDEX: 27.23 KG/M2 | TEMPERATURE: 97.3 F | HEART RATE: 113 BPM | WEIGHT: 190.2 LBS | OXYGEN SATURATION: 100 %

## 2022-12-28 DIAGNOSIS — R16.0 LARGE LIVER: ICD-10-CM

## 2022-12-28 DIAGNOSIS — K21.9 GASTROESOPHAGEAL REFLUX DISEASE WITHOUT ESOPHAGITIS: ICD-10-CM

## 2022-12-28 DIAGNOSIS — E78.1 HYPERTRIGLYCERIDEMIA: ICD-10-CM

## 2022-12-28 DIAGNOSIS — E11.9 DIABETES MELLITUS TYPE 2, NONINSULIN DEPENDENT (HCC): ICD-10-CM

## 2022-12-28 DIAGNOSIS — B35.1 TOENAIL FUNGUS: ICD-10-CM

## 2022-12-28 DIAGNOSIS — F17.210 CIGARETTE NICOTINE DEPENDENCE WITHOUT COMPLICATION: ICD-10-CM

## 2022-12-28 DIAGNOSIS — I10 ESSENTIAL HYPERTENSION: ICD-10-CM

## 2022-12-28 DIAGNOSIS — E11.9 TYPE 2 DIABETES MELLITUS WITHOUT COMPLICATION, WITHOUT LONG-TERM CURRENT USE OF INSULIN (HCC): Primary | ICD-10-CM

## 2022-12-28 DIAGNOSIS — I10 BENIGN ESSENTIAL HTN: ICD-10-CM

## 2022-12-28 LAB — SL AMB POCT HEMOGLOBIN AIC: 12.8 (ref ?–6.5)

## 2022-12-28 RX ORDER — TERBINAFINE HYDROCHLORIDE 250 MG/1
250 TABLET ORAL DAILY
Qty: 42 TABLET | Refills: 1 | Status: SHIPPED | OUTPATIENT
Start: 2022-12-28 | End: 2023-02-08

## 2022-12-28 RX ORDER — GLIMEPIRIDE 4 MG/1
4 TABLET ORAL 2 TIMES DAILY
Qty: 180 TABLET | Refills: 1 | Status: SHIPPED | OUTPATIENT
Start: 2022-12-28

## 2022-12-28 RX ORDER — OMEPRAZOLE 40 MG/1
40 CAPSULE, DELAYED RELEASE ORAL DAILY
Qty: 90 CAPSULE | Refills: 1 | Status: SHIPPED | OUTPATIENT
Start: 2022-12-28

## 2022-12-28 RX ORDER — LOSARTAN POTASSIUM 100 MG/1
100 TABLET ORAL DAILY
Qty: 90 TABLET | Refills: 0 | Status: SHIPPED | OUTPATIENT
Start: 2022-12-28

## 2022-12-28 NOTE — ASSESSMENT & PLAN NOTE
Lab Results   Component Value Date    HGBA1C 12 8 (A) 12/28/2022   Uncontrolled  Continue glimepiride 4 milligrams b i d , metformin 1000 milligrams b i d , Januvia 100 milligrams daily, will start Farxiga 10 milligrams daily  Discussed medication compliance as well as compliance to a diabetic diet

## 2022-12-28 NOTE — PROGRESS NOTES
Diabetic Foot Exam    Patient's shoes and socks removed  Right Foot/Ankle   Right Foot Inspection  Skin Exam: skin normal, skin intact, dry skin, callus and callus  No warmth, no erythema, no maceration, no abnormal color, no pre-ulcer and no ulcer  Sensory   Monofilament testing: intact    Left Foot/Ankle  Left Foot Inspection  Skin Exam: skin normal, skin intact, dry skin and callus  No warmth, no erythema, no maceration, normal color, no pre-ulcer and no ulcer       Sensory   Monofilament testing: intact

## 2022-12-28 NOTE — PROGRESS NOTES
Assessment/Plan:    GERD (gastroesophageal reflux disease)  Controlled on Prilosec 40mg  Large liver  Trend LFT    Type 2 diabetes mellitus without complication, without long-term current use of insulin ([facility])    Lab Results   Component Value Date    HGBA1C 12 8 (A) 12/28/2022   Uncontrolled  Continue glimepiride 4 milligrams b i d , metformin 1000 milligrams b i d , Januvia 100 milligrams daily, will start Farxiga 10 milligrams daily  Discussed medication compliance as well as compliance to a diabetic diet  Benign essential HTN  Uncontrolled continue with pain  Encourage compliance with medication  Hypertriglyceridemia  Discussed following a well-balanced diet and restarting Omega 3 supplementation at 2 grams b i d     Cigarette nicotine dependence without complication  Counseled patient on smoking cessation  BMI Counseling: Body mass index is 27 29 kg/m²  The BMI is above normal  Nutrition recommendations include decreasing portion sizes, encouraging healthy choices of fruits and vegetables, decreasing fast food intake, consuming healthier snacks, limiting drinks that contain sugar, moderation in carbohydrate intake, increasing intake of lean protein, reducing intake of saturated and trans fat and reducing intake of cholesterol  Exercise recommendations include moderate physical activity 150 minutes/week and exercising 3-5 times per week  Rationale for BMI follow-up plan is due to patient being overweight or obese  Diagnoses and all orders for this visit:    Type 2 diabetes mellitus without complication, without long-term current use of insulin ([facility] )  -     Ambulatory Referral to Ophthalmology; Future  -     POCT hemoglobin A1c  -     sitaGLIPtin (JANUVIA) 100 mg tablet; Take 1 tablet (100 mg total) by mouth daily  -     Discontinue: sitaGLIPtin (Januvia) 100 mg tablet; Take 1 tablet (100 mg total) by mouth daily  -     dapagliflozin (Farxiga) 10 MG tablet;  Take 1 tablet (10 mg total) by mouth in the morning  -     Microalbumin / creatinine urine ratio    Diabetes mellitus type 2, noninsulin dependent (HCC)  -     metFORMIN (GLUCOPHAGE) 1000 MG tablet; Take 1 tablet (1,000 mg total) by mouth 2 (two) times a day with meals  -     glimepiride (AMARYL) 4 mg tablet; Take 1 tablet (4 mg total) by mouth 2 (two) times a day    Gastroesophageal reflux disease without esophagitis  -     omeprazole (PriLOSEC) 40 MG capsule; Take 1 capsule (40 mg total) by mouth daily    Essential hypertension  -     losartan (COZAAR) 100 MG tablet; Take 1 tablet (100 mg total) by mouth daily  -     CBC and differential  -     Comprehensive metabolic panel; Future  -     Lipid panel    Toenail fungus  -     terbinafine (LamISIL) 250 mg tablet; Take 1 tablet (250 mg total) by mouth daily    Large liver    Benign essential HTN    Hypertriglyceridemia    Cigarette nicotine dependence without complication          Subjective:      Patient ID: Ronnie Westfall is a 47 y o  male  Patient presents today to follow-up on DM and HTN  Patient has been very noncompliant with his medications, he has been taking his the metformin twice a day, and his Amaryl twice a day when he remembers  Has not been taking [place]  Patient states he has a very unhealthy high-calorie diet  Patient also states that he has been taking his blood pressure medication on and off  Patient also states he has been taking his cholesterol medication on and off  He reports he drinks alcohol "a few days a week", till he "catches a buzz"  Due for fasting blood work  Screenings:    Eye Doctor-wear glasses, he is due  Dentist- every 6 months        Diabetes  He presents for his follow-up diabetic visit  He has type 2 diabetes mellitus  His disease course has been worsening  Pertinent negatives for hypoglycemia include no dizziness or headaches   Pertinent negatives for diabetes include no blurred vision, no chest pain, no polydipsia, no polyphagia, no polyuria and no weakness  There are no hypoglycemic complications  Risk factors for coronary artery disease include diabetes mellitus, dyslipidemia, obesity, male sex and tobacco exposure  Current diabetic treatments:   Patient currently taking metformin in Amaryl however he is not compliant  He is compliant with treatment some of the time  Prior visit with dietitian:  patient refuses dietitian visit  He rarely participates in exercise  His bedtime blood glucose is taken between 10-11 pm  His bedtime blood glucose range is generally 110-130 mg/dl  Hypertension  This is a chronic problem  The current episode started more than 1 year ago  The problem has been waxing and waning since onset  The problem is uncontrolled  Pertinent negatives include no anxiety, blurred vision, chest pain, headaches, neck pain, palpitations, peripheral edema, PND or shortness of breath  Risk factors for coronary artery disease include male gender, obesity, sedentary lifestyle, smoking/tobacco exposure and dyslipidemia  Treatments tried:  patient currently taking losartan  Compliance problems include exercise and diet ( patient takes his medication on and off, not compliant)  Hyperlipidemia  This is a chronic problem  The current episode started more than 1 year ago  Condition status:  patient did not have lipid panel done prior to office visit today  Exacerbating diseases include diabetes and obesity  Pertinent negatives include no chest pain or shortness of breath  Treatments tried:  patient currently taking atorvastatin, fenofibrate as well as fish oil however patient has not been taking these consistently  Compliance problems include adherence to diet          The following portions of the patient's history were reviewed and updated as appropriate: allergies, current medications, past family history, past medical history, past social history, past surgical history and problem list     Review of Systems   Constitutional: Negative for activity change, appetite change, chills, diaphoresis and fever  HENT: Negative for congestion, ear discharge, ear pain, postnasal drip, rhinorrhea, sinus pressure, sinus pain and sore throat  Eyes: Negative for blurred vision, pain, discharge, itching and visual disturbance  Respiratory: Negative for cough, chest tightness, shortness of breath and wheezing  Cardiovascular: Negative for chest pain, palpitations, leg swelling and PND  Gastrointestinal: Negative for abdominal pain, constipation, diarrhea, nausea and vomiting  Endocrine: Negative for polydipsia, polyphagia and polyuria  Genitourinary: Negative for difficulty urinating, dysuria and urgency  Musculoskeletal: Negative for arthralgias, back pain and neck pain  Skin: Negative for rash and wound  Neurological: Negative for dizziness, weakness, numbness and headaches           Past Medical History:   Diagnosis Date   • Cellulitis of elbow     last assessed - [id number]   • Diabetes mellitus ([facility] )    • Diverticulitis    • Fatigue     last assessed - [id number]   • GERD (gastroesophageal reflux disease)    • History of gastroesophageal reflux (GERD)     Resolved - [id number]   • Hypertension    • Iron overload     last assessed - [id number]   • Low HDL (under 40)     last assessed - [id number]   • Perineal abscess 7/17/2020   • Tinea pedis of both feet     last assessed - [id number]         Current Outpatient Medications:   •  dapagliflozin (Farxiga) 10 MG tablet, Take 1 tablet (10 mg total) by mouth in the morning, Disp: 90 tablet, Rfl: 1  •  glimepiride (AMARYL) 4 mg tablet, Take 1 tablet (4 mg total) by mouth 2 (two) times a day, Disp: 180 tablet, Rfl: 1  •  losartan (COZAAR) 100 MG tablet, Take 1 tablet (100 mg total) by mouth daily, Disp: 90 tablet, Rfl: 0  •  metFORMIN (GLUCOPHAGE) 1000 MG tablet, Take 1 tablet (1,000 mg total) by mouth 2 (two) times a day with meals, Disp: 60 tablet, Rfl: 0  •  Multiple Vitamins-Minerals (MULTIVITAMIN ADULT) TABS, Take 1 tablet by mouth daily, Disp: , Rfl:   •  omeprazole (PriLOSEC) 40 MG capsule, Take 1 capsule (40 mg total) by mouth daily, Disp: 90 capsule, Rfl: 1  •  sitaGLIPtin (JANUVIA) 100 mg tablet, Take 1 tablet (100 mg total) by mouth daily, Disp: 90 tablet, Rfl: 1  •  terbinafine (LamISIL) 250 mg tablet, Take 1 tablet (250 mg total) by mouth daily, Disp: 42 tablet, Rfl: 1  •  Cholecalciferol 25 MCG (1000 UT) tablet, Take 4,000 Units by mouth daily  , Disp: , Rfl:   •  Omega 3-6-9 Fatty Acids (OMEGA 3-6-9 COMPLEX PO), Take 1 capsule by mouth daily (Patient not taking: Reported on 8/16/2021), Disp: , Rfl:     No Known Allergies    Social History   Past Surgical History:   Procedure Laterality Date   • TONSILLECTOMY       Family History   Problem Relation Age of Onset   • Leukemia Father    • Diabetes Family    • Hypertension Family    • Diabetes Mother        Objective:  /96 (BP Location: Left arm, Patient Position: Sitting, Cuff Size: Standard)   Pulse (!) 113   Temp (!) 97 3 °F (36 3 °C) (Temporal)   Resp 18   Ht 5' 10" (1 778 m)   Wt 86 3 kg (190 lb 3 2 oz)   SpO2 100%   BMI 27 29 kg/m²     Recent Results (from the past 1344 hour(s))   POCT hemoglobin A1c    Collection Time: 12/28/22  2:54 PM   Result Value Ref Range    Hemoglobin A1C 12 8 (A) 6 5            Physical Exam  Constitutional:       General: He is not in acute distress  Appearance: He is well-developed  He is not diaphoretic  HENT:      Head: Normocephalic and atraumatic  Right Ear: External ear normal       Left Ear: External ear normal       Nose: Nose normal       Mouth/Throat:      Pharynx: No oropharyngeal exudate  Eyes:      General:         Right eye: No discharge  Left eye: No discharge  Conjunctiva/sclera: Conjunctivae normal       Pupils: Pupils are equal, round, and reactive to light  Neck:      Thyroid: No thyromegaly  Cardiovascular:      Rate and Rhythm: Normal rate and regular rhythm        Heart sounds: Normal heart sounds  No murmur heard  No friction rub  No gallop  Pulmonary:      Effort: Pulmonary effort is normal  No respiratory distress  Breath sounds: Normal breath sounds  No stridor  No wheezing or rales  Abdominal:      General: Bowel sounds are normal  There is no distension  Palpations: Abdomen is soft  Tenderness: There is no abdominal tenderness  Musculoskeletal:      Cervical back: Normal range of motion and neck supple  Lymphadenopathy:      Cervical: No cervical adenopathy  Skin:     General: Skin is warm and dry  Findings: No erythema or rash  Neurological:      Mental Status: He is alert and oriented to person, place, and time  Psychiatric:         Behavior: Behavior normal          Thought Content:  Thought content normal          Judgment: Judgment normal

## 2023-01-06 ENCOUNTER — OFFICE VISIT (OUTPATIENT)
Dept: URGENT CARE | Age: 53
End: 2023-01-06

## 2023-01-06 VITALS
TEMPERATURE: 96.9 F | RESPIRATION RATE: 18 BRPM | SYSTOLIC BLOOD PRESSURE: 152 MMHG | OXYGEN SATURATION: 99 % | HEART RATE: 81 BPM | DIASTOLIC BLOOD PRESSURE: 86 MMHG

## 2023-01-06 DIAGNOSIS — R09.81 NASAL CONGESTION: ICD-10-CM

## 2023-01-06 DIAGNOSIS — R05.1 ACUTE COUGH: Primary | ICD-10-CM

## 2023-01-06 RX ORDER — BENZONATATE 100 MG/1
100 CAPSULE ORAL 3 TIMES DAILY PRN
Qty: 20 CAPSULE | Refills: 0 | Status: SHIPPED | OUTPATIENT
Start: 2023-01-06

## 2023-01-06 RX ORDER — FLUTICASONE PROPIONATE 50 MCG
2 SPRAY, SUSPENSION (ML) NASAL DAILY
Qty: 15.8 ML | Refills: 0 | Status: SHIPPED | OUTPATIENT
Start: 2023-01-06

## 2023-01-06 NOTE — LETTER
[facility] [address]  [address] [address]  Dept: [phone]    January 6, 2023    Patient: Rosina Dietz  YOB: 1970    Rosina Dietz was seen and evaluated at our [facility]  Please note if Covid and Flu tests are negative, they may return to work when fever free for 24 hours without the use of a fever reducing agent  If Covid or Flu test is positive, they may return to work on 1/8/23, as this is 5 days from the onset of symptoms  Upon return, they must then adhere to strict masking for an additional 5 days      Sincerely,    Shamir Tolentino

## 2023-01-06 NOTE — PATIENT INSTRUCTIONS
Use flonase as directed  Use benzonatate as directed as needed for cough  Hydration and rest   Acetaminophen and ibuprofen for pain relief and fever reduction  COVID/influenza testing  Use the [facility] to obtain lab results  PCP follow up in 3-5 days  Go to an emergency department if difficulty breathing occurs or if symptoms worsen      Recommended supplements for potential COVID-19 is the following: Vitamin D3 2000 IU  daily ,  Vitamin C 1g  every 12 hours , Multivitamin Daily

## 2023-01-06 NOTE — PROGRESS NOTES
[address] [facility]        NAME: Alexander Ferreira is a 47 y o  male  : 1970    MRN: [id number]  DATE: 2023  TIME: 3:12 PM      Assessment and Plan     Acute cough [R05 1]  1  Acute cough  Covid19 and INFLUENZA A/B PCR    benzonatate (TESSALON PERLES) 100 mg capsule      2  Nasal congestion  fluticasone (FLONASE) 50 mcg/act nasal spray            Patient Instructions       Use flonase as directed  Use benzonatate as directed as needed for cough  Hydration and rest   Acetaminophen and ibuprofen for pain relief and fever reduction  COVID/influenza testing  Use the [facility] to obtain lab results  PCP follow up in 3-5 days  Go to an emergency department if difficulty breathing occurs or if symptoms worsen  Recommended supplements for potential COVID-19 is the following: Vitamin D3 2000 IU  daily ,  Vitamin C 1g  every 12 hours , Multivitamin Daily   Chief Complaint     Chief Complaint   Patient presents with   • Nasal Congestion     Patient relates for past few days with severe nasal congestion  Cough comes and goes  Started Tuesday morning  Home covid test negative  History of Present Illness     Patient is a 48-year-old male who presents with congestion, sinus pressure, and cough for 3 days  States the cough is intermittent in nature  Reports he developed chills while sitting here at this visit  Denies nausea, vomiting, or diarrhea at this time  States he does smoke but has been decreasing since he has been sick  Denies known sick contacts  States he has been showering to help open up his sinuses  Review of Systems     Review of Systems   Constitutional: Positive for chills  Negative for fever  HENT: Positive for congestion, sinus pressure and sinus pain  Respiratory: Positive for cough  Gastrointestinal: Negative for diarrhea, nausea and vomiting  All other systems reviewed and are negative          Current Medications       Current Outpatient Medications:   •  benzonatate (TESSALON PERLES) 100 mg capsule, Take 1 capsule (100 mg total) by mouth 3 (three) times a day as needed for cough, Disp: 20 capsule, Rfl: 0  •  dapagliflozin (Farxiga) 10 MG tablet, Take 1 tablet (10 mg total) by mouth in the morning, Disp: 90 tablet, Rfl: 1  •  fluticasone (FLONASE) 50 mcg/act nasal spray, 2 sprays into each nostril daily, Disp: 15 8 mL, Rfl: 0  •  glimepiride (AMARYL) 4 mg tablet, Take 1 tablet (4 mg total) by mouth 2 (two) times a day, Disp: 180 tablet, Rfl: 1  •  losartan (COZAAR) 100 MG tablet, Take 1 tablet (100 mg total) by mouth daily, Disp: 90 tablet, Rfl: 0  •  metFORMIN (GLUCOPHAGE) 1000 MG tablet, Take 1 tablet (1,000 mg total) by mouth 2 (two) times a day with meals, Disp: 60 tablet, Rfl: 0  •  Multiple Vitamins-Minerals (MULTIVITAMIN ADULT) TABS, Take 1 tablet by mouth daily, Disp: , Rfl:   •  omeprazole (PriLOSEC) 40 MG capsule, Take 1 capsule (40 mg total) by mouth daily, Disp: 90 capsule, Rfl: 1  •  sitaGLIPtin (JANUVIA) 100 mg tablet, Take 1 tablet (100 mg total) by mouth daily, Disp: 90 tablet, Rfl: 1  •  terbinafine (LamISIL) 250 mg tablet, Take 1 tablet (250 mg total) by mouth daily, Disp: 42 tablet, Rfl: 1  •  Cholecalciferol 25 MCG (1000 UT) tablet, Take 4,000 Units by mouth daily  , Disp: , Rfl:   •  Omega 3-6-9 Fatty Acids (OMEGA 3-6-9 COMPLEX PO), Take 1 capsule by mouth daily (Patient not taking: Reported on 8/16/2021), Disp: , Rfl:     Current Allergies     Allergies as of 01/06/2023   • (No Known Allergies)              The following portions of the patient's history were reviewed and updated as appropriate: allergies, current medications, past family history, past medical history, past social history, past surgical history and problem list      Past Medical History:   Diagnosis Date   • Cellulitis of elbow     last assessed - [id number]   • Diabetes mellitus ([address] )    • Diverticulitis    • Fatigue     last assessed - [id number]   • GERD (gastroesophageal reflux disease)    • History of gastroesophageal reflux (GERD)     Resolved - [id number]   • Hypertension    • Iron overload     last assessed - 65AQB0244   • Low HDL (under 40)     last assessed - 01Sep2015   • Perineal abscess 7/17/2020   • Tinea pedis of both feet     last assessed - [id number]       Past Surgical History:   Procedure Laterality Date   • TONSILLECTOMY         Family History   Problem Relation Age of Onset   • Leukemia Father    • Diabetes Family    • Hypertension Family    • Diabetes Mother          Medications have been verified  Objective     /86   Pulse 81   Temp (!) 96 9 °F (36 1 °C)   Resp 18   SpO2 99%   No LMP for male patient  Physical Exam     Physical Exam  Vitals and nursing note reviewed  Constitutional:       General: He is not in acute distress  Appearance: Normal appearance  He is not ill-appearing, toxic-appearing or diaphoretic  HENT:      Right Ear: Tympanic membrane, ear canal and external ear normal       Left Ear: Tympanic membrane, ear canal and external ear normal       Nose: Mucosal edema and congestion present  Mouth/Throat:      Lips: Pink  Mouth: Mucous membranes are moist       Pharynx: Oropharynx is clear  Uvula midline  No oropharyngeal exudate or posterior oropharyngeal erythema  Cardiovascular:      Rate and Rhythm: Normal rate  Pulses: Normal pulses  Heart sounds: Normal heart sounds, S1 normal and S2 normal    Pulmonary:      Effort: Pulmonary effort is normal       Breath sounds: Normal breath sounds and air entry  No stridor, decreased air movement or transmitted upper airway sounds  No decreased breath sounds, wheezing, rhonchi or rales  Skin:     General: Skin is warm  Capillary Refill: Capillary refill takes less than 2 seconds  Neurological:      General: No focal deficit present  Mental Status: He is alert     Psychiatric:         Mood and Affect: Mood normal  Behavior: Behavior normal          Thought Content:  Thought content normal          Judgment: Judgment normal

## 2023-01-08 LAB
FLUAV RNA RESP QL NAA+PROBE: NEGATIVE
FLUBV RNA RESP QL NAA+PROBE: NEGATIVE
SARS-COV-2 RNA RESP QL NAA+PROBE: NEGATIVE

## 2023-01-09 ENCOUNTER — VBI (OUTPATIENT)
Dept: ADMINISTRATIVE | Facility: OTHER | Age: 53
End: 2023-01-09

## 2023-01-20 ENCOUNTER — TELEPHONE (OUTPATIENT)
Dept: INTERNAL MEDICINE CLINIC | Facility: OTHER | Age: 53
End: 2023-01-20

## 2023-01-25 NOTE — TELEPHONE ENCOUNTER
Called patient and he stated that he has been taking the medications that the ER prescribed and he's doing better. Does not feel that he needs an appointment at this time.

## 2023-02-01 ENCOUNTER — OFFICE VISIT (OUTPATIENT)
Dept: URGENT CARE | Age: 53
End: 2023-02-01

## 2023-02-01 VITALS
SYSTOLIC BLOOD PRESSURE: 132 MMHG | RESPIRATION RATE: 18 BRPM | HEART RATE: 105 BPM | TEMPERATURE: 98.2 F | DIASTOLIC BLOOD PRESSURE: 84 MMHG | OXYGEN SATURATION: 99 %

## 2023-02-01 DIAGNOSIS — R19.7 DIARRHEA, UNSPECIFIED TYPE: Primary | ICD-10-CM

## 2023-02-01 RX ORDER — CIPROFLOXACIN 500 MG/1
TABLET, FILM COATED ORAL
COMMUNITY
Start: 2023-01-20

## 2023-02-01 RX ORDER — METRONIDAZOLE 500 MG/1
TABLET ORAL
COMMUNITY
Start: 2023-01-20

## 2023-02-01 NOTE — LETTER
February 1, 2023     Patient: Madeline Hodges   YOB: 1970   Date of Visit: 2/1/2023       To Whom it May Concern:    Sirera Vines was seen in my clinic on 2/1/2023  He may return to school on 2/2/2023  If you have any questions or concerns, please don't hesitate to call           Sincerely,          DIANA Munguia        CC: No Recipients

## 2023-02-03 NOTE — PROGRESS NOTES
[facility]        NAME: Bladimir Santo is a 47 y o  male  : 1970    MRN: [id number]  DATE: February 3, 2023  TIME: 8:12 AM    Assessment and Plan   Diarrhea, unspecified type [R19 7]  1  Diarrhea, unspecified type        Patient presents with c/o of ongoing diarrhea  States was recently diagnosed with Colitis  Has not had PCP/GI follow-up   Denies dizziness, blood in stool  Discussed importance of PCP and GI follow-up   Has already been treated with medication  Patient Instructions       Follow up with PCP as needed    Chief Complaint     Chief Complaint   Patient presents with   • Diarrhea     Diagnosed with colitis last week  Has been having diarrhea, low energy  History of Present Illness       Patient presents with c/o of ongoing diarrhea  States was recently diagnosed with Colitis  Has not had PCP/GI follow-up   Denies dizziness, blood in stool  Discussed importance of PCP and GI follow-up   Has already been treated with medication  Review of Systems   Review of Systems   Constitutional: Positive for fatigue  Negative for chills and fever  HENT: Negative for congestion, ear pain, postnasal drip, sinus pain and sore throat  Eyes: Negative for pain and itching  Respiratory: Negative for cough, shortness of breath and wheezing  Cardiovascular: Negative for chest pain and palpitations  Gastrointestinal: Positive for diarrhea  Negative for abdominal pain, constipation, nausea and vomiting  Genitourinary: Negative for difficulty urinating and hematuria  Musculoskeletal: Negative for arthralgias and myalgias  Skin: Negative for rash  Neurological: Negative for dizziness, light-headedness and headaches  Psychiatric/Behavioral: Negative for agitation and sleep disturbance  The patient is not nervous/anxious            Current Medications       Current Outpatient Medications:   •  ciprofloxacin (CIPRO) 500 mg tablet, TAKE 1 TABLET (500 MG TOTAL) BY MOUTH 2 (TWO) TIMES A DAY FOR 7 DAYS , Disp: , Rfl:   •  dapagliflozin (Farxiga) 10 MG tablet, Take 1 tablet (10 mg total) by mouth in the morning, Disp: 90 tablet, Rfl: 1  •  fluticasone (FLONASE) 50 mcg/act nasal spray, 2 sprays into each nostril daily, Disp: 15 8 mL, Rfl: 0  •  glimepiride (AMARYL) 4 mg tablet, Take 1 tablet (4 mg total) by mouth 2 (two) times a day, Disp: 180 tablet, Rfl: 1  •  losartan (COZAAR) 100 MG tablet, Take 1 tablet (100 mg total) by mouth daily, Disp: 90 tablet, Rfl: 0  •  metFORMIN (GLUCOPHAGE) 1000 MG tablet, Take 1 tablet (1,000 mg total) by mouth 2 (two) times a day with meals, Disp: 60 tablet, Rfl: 0  •  Multiple Vitamins-Minerals (MULTIVITAMIN ADULT) TABS, Take 1 tablet by mouth daily, Disp: , Rfl:   •  omeprazole (PriLOSEC) 40 MG capsule, Take 1 capsule (40 mg total) by mouth daily, Disp: 90 capsule, Rfl: 1  •  sitaGLIPtin (JANUVIA) 100 mg tablet, Take 1 tablet (100 mg total) by mouth daily, Disp: 90 tablet, Rfl: 1  •  terbinafine (LamISIL) 250 mg tablet, Take 1 tablet (250 mg total) by mouth daily, Disp: 42 tablet, Rfl: 1  •  benzonatate (TESSALON PERLES) 100 mg capsule, Take 1 capsule (100 mg total) by mouth 3 (three) times a day as needed for cough, Disp: 20 capsule, Rfl: 0  •  Cholecalciferol 25 MCG (1000 UT) tablet, Take 4,000 Units by mouth daily  , Disp: , Rfl:   •  metroNIDAZOLE (FLAGYL) 500 mg tablet, TAKE 1 TABLET (500 MG TOTAL) BY MOUTH 3 (THREE) TIMES A DAY FOR 7 DAYS , Disp: , Rfl:   •  Omega 3-6-9 Fatty Acids (OMEGA 3-6-9 COMPLEX PO), Take 1 capsule by mouth daily (Patient not taking: Reported on 8/16/2021), Disp: , Rfl:     Current Allergies     Allergies as of 02/01/2023   • (No Known Allergies)            The following portions of the patient's history were reviewed and updated as appropriate: allergies, current medications, past family history, past medical history, past social history, past surgical history and problem list      Past Medical History: Diagnosis Date   • Cellulitis of elbow     last assessed - [id number]   • Diabetes mellitus ([address] )    • Diverticulitis    • Fatigue     last assessed - [id number]   • GERD (gastroesophageal reflux disease)    • History of gastroesophageal reflux (GERD)     Resolved - [id number]   • Hypertension    • Iron overload     last assessed - 76EYT6407   • Low HDL (under 40)     last assessed - 24Hez6784   • Perineal abscess 7/17/2020   • Tinea pedis of both feet     last assessed - 18LXY3986       Past Surgical History:   Procedure Laterality Date   • TONSILLECTOMY         Family History   Problem Relation Age of Onset   • Leukemia Father    • Diabetes Family    • Hypertension Family    • Diabetes Mother          Medications have been verified  Objective   /84   Pulse 105   Temp 98 2 °F (36 8 °C)   Resp 18   SpO2 99%   No LMP for male patient  Physical Exam     Physical Exam  Vitals reviewed  Constitutional:       General: He is not in acute distress  Appearance: Normal appearance  He is not ill-appearing  HENT:      Head: Normocephalic and atraumatic  Eyes:      Extraocular Movements: Extraocular movements intact  Conjunctiva/sclera: Conjunctivae normal    Pulmonary:      Effort: Pulmonary effort is normal    Skin:     General: Skin is warm  Neurological:      General: No focal deficit present  Mental Status: He is alert     Psychiatric:         Mood and Affect: Mood normal          Behavior: Behavior normal          Judgment: Judgment normal

## 2023-02-14 ENCOUNTER — APPOINTMENT (OUTPATIENT)
Dept: LAB | Facility: IMAGING CENTER | Age: 53
End: 2023-02-14

## 2023-02-14 ENCOUNTER — OFFICE VISIT (OUTPATIENT)
Dept: INTERNAL MEDICINE CLINIC | Facility: OTHER | Age: 53
End: 2023-02-14

## 2023-02-14 VITALS
OXYGEN SATURATION: 98 % | TEMPERATURE: 99.1 F | HEART RATE: 84 BPM | DIASTOLIC BLOOD PRESSURE: 86 MMHG | HEIGHT: 70 IN | WEIGHT: 194 LBS | SYSTOLIC BLOOD PRESSURE: 148 MMHG | BODY MASS INDEX: 27.77 KG/M2 | RESPIRATION RATE: 20 BRPM

## 2023-02-14 DIAGNOSIS — K52.9 COLITIS: ICD-10-CM

## 2023-02-14 DIAGNOSIS — I10 ESSENTIAL HYPERTENSION: ICD-10-CM

## 2023-02-14 DIAGNOSIS — E11.9 TYPE 2 DIABETES MELLITUS WITHOUT COMPLICATION, WITHOUT LONG-TERM CURRENT USE OF INSULIN (HCC): Primary | ICD-10-CM

## 2023-02-14 DIAGNOSIS — L60.8 ONYCHOMADESIS OF TOENAIL: ICD-10-CM

## 2023-02-14 DIAGNOSIS — I10 BENIGN ESSENTIAL HTN: ICD-10-CM

## 2023-02-14 DIAGNOSIS — E11.9 TYPE 2 DIABETES MELLITUS WITHOUT COMPLICATION, WITHOUT LONG-TERM CURRENT USE OF INSULIN (HCC): ICD-10-CM

## 2023-02-14 DIAGNOSIS — F33.0 MILD EPISODE OF RECURRENT MAJOR DEPRESSIVE DISORDER (HCC): ICD-10-CM

## 2023-02-14 LAB
ALBUMIN SERPL BCP-MCNC: 3.8 G/DL (ref 3.5–5)
ALP SERPL-CCNC: 47 U/L (ref 46–116)
ALT SERPL W P-5'-P-CCNC: 25 U/L (ref 12–78)
ANION GAP SERPL CALCULATED.3IONS-SCNC: 6 MMOL/L (ref 4–13)
AST SERPL W P-5'-P-CCNC: 12 U/L (ref 5–45)
BASOPHILS # BLD AUTO: 0.08 THOUSANDS/ÂΜL (ref 0–0.1)
BASOPHILS NFR BLD AUTO: 1 % (ref 0–1)
BILIRUB SERPL-MCNC: 0.4 MG/DL (ref 0.2–1)
BUN SERPL-MCNC: 17 MG/DL (ref 5–25)
CALCIUM SERPL-MCNC: 9.4 MG/DL (ref 8.3–10.1)
CHLORIDE SERPL-SCNC: 105 MMOL/L (ref 96–108)
CHOLEST SERPL-MCNC: 174 MG/DL
CO2 SERPL-SCNC: 28 MMOL/L (ref 21–32)
CREAT SERPL-MCNC: 0.79 MG/DL (ref 0.6–1.3)
CREAT UR-MCNC: 179 MG/DL
EOSINOPHIL # BLD AUTO: 0.1 THOUSAND/ÂΜL (ref 0–0.61)
EOSINOPHIL NFR BLD AUTO: 1 % (ref 0–6)
ERYTHROCYTE [DISTWIDTH] IN BLOOD BY AUTOMATED COUNT: 13 % (ref 11.6–15.1)
EST. AVERAGE GLUCOSE BLD GHB EST-MCNC: 269 MG/DL
GFR SERPL CREATININE-BSD FRML MDRD: 102 ML/MIN/1.73SQ M
GLUCOSE P FAST SERPL-MCNC: 278 MG/DL (ref 65–99)
HBA1C MFR BLD: 11 %
HCT VFR BLD AUTO: 46.7 % (ref 36.5–49.3)
HDLC SERPL-MCNC: 53 MG/DL
HGB BLD-MCNC: 15.3 G/DL (ref 12–17)
IMM GRANULOCYTES # BLD AUTO: 0.1 THOUSAND/UL (ref 0–0.2)
IMM GRANULOCYTES NFR BLD AUTO: 1 % (ref 0–2)
LDLC SERPL CALC-MCNC: 99 MG/DL (ref 0–100)
LYMPHOCYTES # BLD AUTO: 2.03 THOUSANDS/ÂΜL (ref 0.6–4.47)
LYMPHOCYTES NFR BLD AUTO: 12 % (ref 14–44)
MCH RBC QN AUTO: 30 PG (ref 26.8–34.3)
MCHC RBC AUTO-ENTMCNC: 32.8 G/DL (ref 31.4–37.4)
MCV RBC AUTO: 92 FL (ref 82–98)
MICROALBUMIN UR-MCNC: 53.4 MG/L (ref 0–20)
MICROALBUMIN/CREAT 24H UR: 30 MG/G CREATININE (ref 0–30)
MONOCYTES # BLD AUTO: 1.07 THOUSAND/ÂΜL (ref 0.17–1.22)
MONOCYTES NFR BLD AUTO: 6 % (ref 4–12)
NEUTROPHILS # BLD AUTO: 13.67 THOUSANDS/ÂΜL (ref 1.85–7.62)
NEUTS SEG NFR BLD AUTO: 79 % (ref 43–75)
NONHDLC SERPL-MCNC: 121 MG/DL
NRBC BLD AUTO-RTO: 0 /100 WBCS
PLATELET # BLD AUTO: 292 THOUSANDS/UL (ref 149–390)
PMV BLD AUTO: 9.5 FL (ref 8.9–12.7)
POTASSIUM SERPL-SCNC: 3.7 MMOL/L (ref 3.5–5.3)
PROT SERPL-MCNC: 7.1 G/DL (ref 6.4–8.4)
RBC # BLD AUTO: 5.1 MILLION/UL (ref 3.88–5.62)
SODIUM SERPL-SCNC: 139 MMOL/L (ref 135–147)
TRIGL SERPL-MCNC: 109 MG/DL
WBC # BLD AUTO: 17.05 THOUSAND/UL (ref 4.31–10.16)

## 2023-02-14 RX ORDER — BUPROPION HYDROCHLORIDE 150 MG/1
150 TABLET ORAL EVERY MORNING
Qty: 30 TABLET | Refills: 5 | Status: SHIPPED | OUTPATIENT
Start: 2023-02-14

## 2023-02-14 NOTE — PROGRESS NOTES
Assessment/Plan:    Mild episode of recurrent major depressive disorder (HCC)  We will start Wellbutrin 150 mg daily  F/u in 3 months  Benign essential HTN  Elevated today however has been not feeling well  Discussed restarting losartan 100 mg daily  Type 2 diabetes mellitus without complication, without long-term current use of insulin ([facility] [address] )  Will follow up A1c  Continue glimiperide, Farxiga, and Januvia  Hold metformin on account of acute colitis  Colitis  Discussed adhering to a bland diet  Defer on further antibiotics at this time  Follow up with GI  Diagnoses and all orders for this visit:    Type 2 diabetes mellitus without complication, without long-term current use of insulin (HCC)    Mild episode of recurrent major depressive disorder (HCC)  -     buPROPion (Wellbutrin XL) 150 mg 24 hr tablet; Take 1 tablet (150 mg total) by mouth every morning    Benign essential HTN    Colitis            Depression Screening and Follow-up Plan: Patient's depression screening was positive with a PHQ-2 score of 6  Their PHQ-9 score was 12  Subjective:      Patient ID: Danuta Segal is a 47 y o  male  Chief Complaint   Patient presents with   • paperwork     Fmla, last seen by Josy Short 12/28/22   • hm     Bmi f/u plan, HIV, annual exam, phq, eye exam ordered 12/28/22, foot exam   • Hx Of Colitis     Colitis flare up sees gi 4/12/23   • Depression     Girlfriend notices that he has been more depressed and wakes up cranky and angry   • Medication Refill     Would like a refill of the cream for toe nail fungus   • Weight Loss     Lost 45 lb in last 3 months   • Hypertension     elevated       48-year-old male seen today with concern for recurrent colitis, symptoms started approximately 4-months ago  He was seen on 1/19/2023 for acute GI symptoms at [facility] to which he was started on ciprofloxacin and Flagyl to which she had improvement of his symptoms    Upon completing the antibiotics he had resolution of symptoms however had recurrence 1 week later  Symptoms improved again however he had another recurrence last week  Symptoms consist of nausea, vomiting, and diarrhea  He denies any melena, hematochezia and mucus in BM  Stools have been watery  He has lost approximately 30 lbs over the past 3 5 months  He has missed a lot of days of work due to the symptoms, most recently from 2/10 through today  He is feeling slightly better today  He has an appointment scheduled with SARAH BRAUN on April 12  Stool studies were negative  He has been feeling depressed for some time  His wife has noted noticed he has become more irritable for the past 6 months  The following portions of the patient's history were reviewed and updated as appropriate: allergies, current medications, past family history, past medical history, past social history, past surgical history and problem list     Review of Systems   Constitutional: Negative for activity change, appetite change, chills, diaphoresis, fatigue and fever  HENT: Negative for congestion, postnasal drip, rhinorrhea, sinus pressure, sinus pain, sneezing and sore throat  Eyes: Negative for visual disturbance  Respiratory: Negative for apnea, cough, choking, chest tightness, shortness of breath and wheezing  Cardiovascular: Negative for chest pain, palpitations and leg swelling  Gastrointestinal: Negative for abdominal distention, abdominal pain, anal bleeding, blood in stool, constipation, diarrhea, nausea and vomiting  Endocrine: Negative for cold intolerance and heat intolerance  Genitourinary: Negative for difficulty urinating, dysuria and hematuria  Musculoskeletal: Negative  Skin: Negative  Neurological: Negative for dizziness, weakness, light-headedness, numbness and headaches  Hematological: Negative for adenopathy  Psychiatric/Behavioral: Negative for agitation, sleep disturbance and suicidal ideas     All other systems reviewed and are negative          Past Medical History:   Diagnosis Date   • Cellulitis of elbow     last assessed - [id number]   • Diabetes mellitus ([facility] [address] )    • Diverticulitis    • Fatigue     last assessed - [id number]   • GERD (gastroesophageal reflux disease)    • History of gastroesophageal reflux (GERD)     Resolved - [id number]   • Hypertension    • Iron overload     last assessed - [id number]   • Low HDL (under 40)     last assessed - [id number]   • Perineal abscess 7/17/2020   • Tinea pedis of both feet     last assessed - [id number]         Current Outpatient Medications:   •  buPROPion (Wellbutrin XL) 150 mg 24 hr tablet, Take 1 tablet (150 mg total) by mouth every morning, Disp: 30 tablet, Rfl: 5  •  Cholecalciferol 25 MCG (1000 UT) tablet, Take 4,000 Units by mouth daily  , Disp: , Rfl:   •  fluticasone (FLONASE) 50 mcg/act nasal spray, 2 sprays into each nostril daily (Patient taking differently: 2 sprays into each nostril as needed), Disp: 15 8 mL, Rfl: 0  •  glimepiride (AMARYL) 4 mg tablet, Take 1 tablet (4 mg total) by mouth 2 (two) times a day, Disp: 180 tablet, Rfl: 1  •  losartan (COZAAR) 100 MG tablet, Take 1 tablet (100 mg total) by mouth daily (Patient taking differently: Take 100 mg by mouth as needed), Disp: 90 tablet, Rfl: 0  •  Multiple Vitamins-Minerals (MULTIVITAMIN ADULT) TABS, Take 1 tablet by mouth daily, Disp: , Rfl:   •  omeprazole (PriLOSEC) 40 MG capsule, Take 1 capsule (40 mg total) by mouth daily, Disp: 90 capsule, Rfl: 1  •  sitaGLIPtin (JANUVIA) 100 mg tablet, Take 1 tablet (100 mg total) by mouth daily, Disp: 90 tablet, Rfl: 1  •  dapagliflozin (Farxiga) 10 MG tablet, Take 1 tablet (10 mg total) by mouth in the morning, Disp: 90 tablet, Rfl: 1    No Known Allergies    Social History   Past Surgical History:   Procedure Laterality Date   • TONSILLECTOMY       Family History   Problem Relation Age of Onset   • Leukemia Father    • Diabetes Family    • Hypertension Family    • Diabetes Mother Objective:  /86 (BP Location: Left arm, Patient Position: Sitting, Cuff Size: Standard)   Pulse 84   Temp 99 1 °F (37 3 °C) (Temporal)   Resp 20   Ht 5' 10" (1 778 m)   Wt 88 kg (194 lb)   SpO2 98%   BMI 27 84 kg/m²     Recent Results (from the past 1344 hour(s))   POCT hemoglobin A1c    Collection Time: 12/28/22  2:54 PM   Result Value Ref Range    Hemoglobin A1C 12 8 (A) 6 5   Covid19 and INFLUENZA A/B PCR    Collection Time: 01/06/23  3:08 PM    Specimen: Nose; Nares   Result Value Ref Range    SARS-CoV-2 Negative Negative    INFLUENZA A PCR Negative Negative    INFLUENZA B PCR Negative Negative            Physical Exam  Vitals and nursing note reviewed  Constitutional:       General: He is not in acute distress  Appearance: He is well-developed  He is not diaphoretic  HENT:      Head: Normocephalic and atraumatic  Eyes:      General: No scleral icterus  Right eye: No discharge  Left eye: No discharge  Conjunctiva/sclera: Conjunctivae normal       Pupils: Pupils are equal, round, and reactive to light  Neck:      Thyroid: No thyromegaly  Vascular: No JVD  Cardiovascular:      Rate and Rhythm: Normal rate and regular rhythm  Pulses: no weak pulses          Dorsalis pedis pulses are 2+ on the right side and 2+ on the left side  Posterior tibial pulses are 2+ on the right side and 2+ on the left side  Heart sounds: Normal heart sounds  No murmur heard  No friction rub  No gallop  Pulmonary:      Effort: Pulmonary effort is normal  No respiratory distress  Breath sounds: Normal breath sounds  No wheezing or rales  Chest:      Chest wall: No tenderness  Abdominal:      General: Bowel sounds are normal  There is no distension  Palpations: Abdomen is soft  There is no mass  Tenderness: There is no abdominal tenderness  There is no guarding or rebound  Musculoskeletal:         General: No tenderness or deformity   Normal range of motion  Cervical back: Normal range of motion and neck supple  Feet:      Right foot:      Skin integrity: Callus and dry skin present  No ulcer, skin breakdown, erythema or warmth  Left foot:      Skin integrity: Callus and dry skin present  No ulcer, skin breakdown, erythema or warmth  Lymphadenopathy:      Cervical: No cervical adenopathy  Skin:     General: Skin is warm and dry  Coloration: Skin is not pale  Findings: No erythema or rash  Neurological:      Mental Status: He is alert and oriented to person, place, and time  Cranial Nerves: No cranial nerve deficit  Coordination: Coordination normal       Deep Tendon Reflexes: Reflexes are normal and symmetric  Psychiatric:         Behavior: Behavior normal          Thought Content: Thought content normal          Judgment: Judgment normal            Diabetic Foot Exam    Patient's shoes and socks removed  Right Foot/Ankle   Right Foot Inspection  Skin Exam: skin normal, skin intact, dry skin, callus and callus  No warmth, no erythema, no maceration, no abnormal color, no pre-ulcer and no ulcer  Toe Exam: ROM and strength within normal limits  Sensory   Monofilament testing: intact    Vascular  Capillary refills: < 3 seconds  The right DP pulse is 2+  The right PT pulse is 2+  Left Foot/Ankle  Left Foot Inspection  Skin Exam: skin normal, skin intact, dry skin and callus  No warmth, no erythema, no maceration, normal color, no pre-ulcer and no ulcer  Toe Exam: ROM and strength within normal limits  Sensory   Monofilament testing: intact    Vascular  Capillary refills: < 3 seconds  The left DP pulse is 2+  The left PT pulse is 2+       Assign Risk Category  No deformity present  No loss of protective sensation  No weak pulses  Risk: 0

## 2023-02-14 NOTE — LETTER
February 14, 2023     Patient: Daryl Mosher  YOB: 1970  Date of Visit: 2/14/2023      To Whom it May Concern:    Randolph Bosaroj is under my professional care  Frankey Birchwood was seen in my office on 2/14/2023  [place] may return to work on 2/15/2023  Please excuse him from work missed from 2/10 through 2/14/2023       If you have any questions or concerns, please don't hesitate to call           Sincerely,          Anne Marie Blackwood MD        CC: No Recipients

## 2023-02-14 NOTE — ASSESSMENT & PLAN NOTE
Will follow up A1c  Continue glimiperide, Farxiga, and Januvia  Hold metformin on account of acute colitis

## 2023-02-16 ENCOUNTER — TELEPHONE (OUTPATIENT)
Dept: INTERNAL MEDICINE CLINIC | Facility: OTHER | Age: 53
End: 2023-02-16

## 2023-02-16 ENCOUNTER — VBI (OUTPATIENT)
Dept: ADMINISTRATIVE | Facility: OTHER | Age: 53
End: 2023-02-16

## 2023-02-16 DIAGNOSIS — E11.9 TYPE 2 DIABETES MELLITUS WITHOUT COMPLICATION, WITHOUT LONG-TERM CURRENT USE OF INSULIN (HCC): ICD-10-CM

## 2023-02-16 DIAGNOSIS — D72.829 LEUKOCYTOSIS, UNSPECIFIED TYPE: Primary | ICD-10-CM

## 2023-02-16 DIAGNOSIS — I10 ESSENTIAL HYPERTENSION: ICD-10-CM

## 2023-02-16 DIAGNOSIS — E11.9 DIABETES MELLITUS TYPE 2, NONINSULIN DEPENDENT (HCC): ICD-10-CM

## 2023-02-16 DIAGNOSIS — Z80.6 FAMILY HISTORY OF LEUKEMIA: ICD-10-CM

## 2023-02-16 RX ORDER — LOSARTAN POTASSIUM 100 MG/1
100 TABLET ORAL DAILY
Qty: 90 TABLET | Refills: 1 | Status: SHIPPED | OUTPATIENT
Start: 2023-02-16

## 2023-02-16 RX ORDER — GLIMEPIRIDE 4 MG/1
4 TABLET ORAL 2 TIMES DAILY
Qty: 180 TABLET | Refills: 1 | Status: SHIPPED | OUTPATIENT
Start: 2023-02-16

## 2023-02-16 NOTE — TELEPHONE ENCOUNTER
Contacted patient to discuss lab results to which his hemoglobin A1c is uncontrolled at 11%  He has been taking Farxiga 10 mg daily, glimepiride 4 mg daily, and Januvia 100 mg daily  He still remains hesitant to start insulin therapy despite lack of control of diabetes  He would like to continue current regimen with the increase of glimepiride from 4 mg daily to 8 mg daily and be reevaluated in 3 months  Then, if his A1c continues to remain uncontrolled he is willing to start insulin  He continues to have persistent leukocytosis  He was evaluated by hematology in the past who recommended surveillance for that was a while ago  His father passed away of leukemia  We will place a referral to hematology  Remaining laboratory studies are within acceptable range

## 2023-02-20 ENCOUNTER — TELEPHONE (OUTPATIENT)
Dept: HEMATOLOGY ONCOLOGY | Facility: CLINIC | Age: 53
End: 2023-02-20

## 2023-02-20 NOTE — TELEPHONE ENCOUNTER
Made attempt to schedule a new patient appointment with Hematology oncology, patient decline to schedule a appointment  Patient stating that he recently lost his job and has no health insurance

## 2024-05-03 ENCOUNTER — OFFICE VISIT (OUTPATIENT)
Dept: DENTISTRY | Facility: CLINIC | Age: 54
End: 2024-05-03

## 2024-05-03 DIAGNOSIS — K08.89 DENTALGIA: Primary | ICD-10-CM

## 2024-05-03 PROCEDURE — D0140 LIMITED ORAL EVALUATION - PROBLEM FOCUSED: HCPCS

## 2024-05-03 PROCEDURE — D0330 PANORAMIC RADIOGRAPHIC IMAGE: HCPCS

## 2024-05-03 NOTE — DENTAL PROCEDURE DETAILS
"Wyatt Dee presents to [facility] dental clinic for emergency visit with chief complaint of sharpness of his lower teeth that has been making eating difficult. H&P reviewed with no changes. Pain 0/10, ASA class II. Patient states that his teeth are \"shot\" and that he knows he has to have them all removed. He is eager to have this done, but requests that his lower teeth are \"smoothed off\" temporarily for his comfort. Patient is diabetic but is better controlled with A1C in the 7s.    On exam, patient has poor dentition with few remaining teeth in various states of decay. He has numerous retained root tips that are sensitive to percussion. He does not have any large dental abscesses that require emergent treatment.    Panoramic radiograph taken.     Discussed plan to remove all remaining teeth with patient and fabricate dentures 6 months following extractions to allow his bone to heal and remodel. Patient verbalized understanding and is willing to wait 6 months to heal so that denture may be fabricated with predictable outcome.    Patient does not have bony architecture that will require significant pre-prosthetic modification or a separate procedure.     Smoothed jagged edges of lower anterior root tips with football perla finishing bur on high speed for patient. Patient reports much improvement in comfort.    Patient left satisfied and ambulatory.    Planned all exos and upper and lower CDs (sent out for pre-auth).    NV: Full-mouth exo  "
No

## 2024-05-08 ENCOUNTER — OFFICE VISIT (OUTPATIENT)
Dept: DENTISTRY | Facility: CLINIC | Age: 54
End: 2024-05-08

## 2024-05-08 VITALS — DIASTOLIC BLOOD PRESSURE: 71 MMHG | SYSTOLIC BLOOD PRESSURE: 103 MMHG | HEART RATE: 97 BPM

## 2024-05-08 DIAGNOSIS — Z98.890 HISTORY OF ORAL SURGERY: Primary | ICD-10-CM

## 2024-05-08 DIAGNOSIS — K08.89 NONRESTORABLE TOOTH: ICD-10-CM

## 2024-05-08 PROCEDURE — D7140 EXTRACTION, ERUPTED TOOTH OR EXPOSED ROOT (ELEVATION AND/OR FORCEPS REMOVAL): HCPCS

## 2024-05-08 RX ORDER — IBUPROFEN 600 MG/1
600 TABLET ORAL EVERY 6 HOURS PRN
Qty: 30 TABLET | Refills: 0 | Status: SHIPPED | OUTPATIENT
Start: 2024-05-08

## 2024-05-08 RX ORDER — AMOXICILLIN 500 MG/1
500 CAPSULE ORAL EVERY 8 HOURS SCHEDULED
Qty: 21 CAPSULE | Refills: 0 | Status: SHIPPED | OUTPATIENT
Start: 2024-05-08 | End: 2024-05-15

## 2024-05-08 NOTE — DENTAL PROCEDURE DETAILS
Wyatt Dee presents to [facility] dental [facility] for full mouth extraction. H&P reviewed with no changes. Pain 0/10, ASA class II.     Consent for extraction of teeth #s 4, 6, 15, 20, 21, 22, 23, 24, 25, 26, 27, 28, 30, and 31 signed by patient.    Barney Protocol    Other Assisting Provider: Yes, Kat (assistant)    Verbal consent obtained? YES  Written consent obtained?  YES    Risks, benefits and alternatives discussed?: YES    Consent given by: Patient Wyatt Dee     Time Out  Immediately prior to the procedure a time out was called: YES    Time Out:  2:40 PM    A time out verifies correct patient, procedure, equipment, support staff and site/side marked as required.    Patient states understanding of procedure being performed: YES    Patient's understanding of procedure matches consent: YES    Procedure consent matches procedure scheduled: YES    Test results available and properly labeled: N/A    Site  Verified with the patient  YES    Radiology Images displayed and confirmed.  If images not available, report reviewed:  YES    Required items - Required blood products, implants, devices and special equipment available: YES    Patient identity confirmed:  YES    Elevated full thickness flap around each tooth with periosteal elevator. Luxated each tooth with straight and spade elevators and extracted each tooth intact using upper and lower universal forceps. No complications occurred during procedure. Patient tolerated procedure well.    Buccal bone of canine eminence came off with extraction of tooth #6. Limited alveoloplasty completed to remove sharp edges from this area with rongeur. Placed 4-0 chromic gut sutures over each extraction socket. Hemostasis achieved, POI given.    Rx written out for amoxicillin 500 mg TID for 7 days and ibuprofen 600mg prn for pain.     Patient left satisfied and ambulatory.    NV: F/u to assess healing in 3 weeks

## 2024-05-30 ENCOUNTER — OFFICE VISIT (OUTPATIENT)
Dept: DENTISTRY | Facility: CLINIC | Age: 54
End: 2024-05-30

## 2024-05-30 DIAGNOSIS — K08.409 S/P TOOTH EXTRACTION: Primary | ICD-10-CM

## 2024-05-30 PROCEDURE — D0171 RE-EVALUATION - POST-OPERATIVE OFFICE VISIT: HCPCS

## 2024-06-02 NOTE — DENTAL PROCEDURE DETAILS
"Wyatt Dee presents to [facility] dental [facility] for f/u post- full mouth extraction three weeks ago at this clinic. H&P reviewed with no changes. Pain 0/10, ASA class II.     Patient states that his discomfort following extractions was minimal and he feels he is healing well. His only area of residual discomfort in his his upper right canine area and only when he presses on it.     On IO exam, patient is healing WNL limits. All extraction sites are either completely mucosalized or nearly mucosalized.    Patient is eager to begin the process of fabricating his upper and lower CDs. Explained that ideal amount of time to wait before beginning dentures is 6 months to allow bone to remodel. Patient says he would not like to wait that long and is also concerned that his insurance under which dentures are currently authorized will  in October. Advised patient that we may see him again in August and if we are satisfied with the progress of his healing, we may begin dentures then.     Patient expressed that he had a partial denture before and could not tolerate the palatal coverage. He is curious if denture could be made without palatal coverage. Advised that this is the way complete upper dentures stay in and it could not be fabricated as a \"horseshoe\" without the placement of implants. He is very interested in implants and says that his insurance will cover it. I advised him that this is unlikely the case but we are happy to communicate with his carrier re any potential implant benefit.     NV: re-eval healing in 2024, start upper, lower CDs if healed   "

## 2024-09-09 ENCOUNTER — OFFICE VISIT (OUTPATIENT)
Dept: DENTISTRY | Facility: CLINIC | Age: 54
End: 2024-09-09

## 2024-09-09 DIAGNOSIS — K08.109 TEETH MISSING: Primary | ICD-10-CM

## 2024-09-09 PROCEDURE — D0191 ASSESSMENT OF A PATIENT: HCPCS

## 2024-09-09 NOTE — DENTAL PROCEDURE DETAILS
".Limited Exam    Wyatt Dee 54 y.o. male presents with self to [facility] for Limited exam  PMH reviewed, no changes, ASA II. Significant medical history: DM, . Significant allergies: NKA.     Chief complaint:  \"I want to get started on my dentures.\"    Pt once again expressed concern regarding having palatal coverage on maxillary complete denture. Reiterated that palatal coverage is essential and that his insurance will likely not cover any implants for overdentures.    Upon IOE, pt is still healing WNL.     Pt expressed frustration since he believed today's appointment was to get started on diagnostic impressions.     NV: Diagnostic impressions for complete dentures.    Attending: Dr. Charles was present in clinic.   "

## 2024-12-18 ENCOUNTER — TELEPHONE (OUTPATIENT)
Dept: INTERNAL MEDICINE CLINIC | Facility: OTHER | Age: 54
End: 2024-12-18

## 2025-05-04 NOTE — TELEPHONE ENCOUNTER
Patient notified and note was faxed to the number provided 
Pt was seen on Monday he got note to be out of work Monday and Tuesday, he stayed home wed as well bc he was not feeling well  He is asking if we can give him one that includes wed date  Please fax to # [phone] 
You may write a note to excuse him from work on wed 
Her/She

## 2025-07-25 ENCOUNTER — OFFICE VISIT (OUTPATIENT)
Dept: DENTISTRY | Facility: CLINIC | Age: 55
End: 2025-07-25

## 2025-07-25 VITALS — HEART RATE: 60 BPM | DIASTOLIC BLOOD PRESSURE: 81 MMHG | TEMPERATURE: 98.6 F | SYSTOLIC BLOOD PRESSURE: 149 MMHG

## 2025-07-25 DIAGNOSIS — Z01.20 ENCOUNTER FOR DENTAL EXAMINATION: Primary | ICD-10-CM

## 2025-07-25 PROCEDURE — WIS5000 PRELIMINARY IMPRESSIONS

## 2025-07-25 PROCEDURE — D0150 COMPREHENSIVE ORAL EVALUATION - NEW OR ESTABLISHED PATIENT: HCPCS
